# Patient Record
Sex: MALE | Race: WHITE | NOT HISPANIC OR LATINO | Employment: OTHER | URBAN - METROPOLITAN AREA
[De-identification: names, ages, dates, MRNs, and addresses within clinical notes are randomized per-mention and may not be internally consistent; named-entity substitution may affect disease eponyms.]

---

## 2021-07-29 ENCOUNTER — PREP FOR SURGERY (OUTPATIENT)
Dept: OTHER | Facility: HOSPITAL | Age: 66
End: 2021-07-29

## 2021-07-29 ENCOUNTER — OFFICE VISIT (OUTPATIENT)
Dept: NEUROSURGERY | Facility: CLINIC | Age: 66
End: 2021-07-29

## 2021-07-29 VITALS
OXYGEN SATURATION: 99 % | SYSTOLIC BLOOD PRESSURE: 132 MMHG | TEMPERATURE: 96.3 F | RESPIRATION RATE: 20 BRPM | WEIGHT: 247.6 LBS | DIASTOLIC BLOOD PRESSURE: 86 MMHG | BODY MASS INDEX: 36.67 KG/M2 | HEART RATE: 86 BPM | HEIGHT: 69 IN

## 2021-07-29 DIAGNOSIS — M48.062 SPINAL STENOSIS, LUMBAR REGION, WITH NEUROGENIC CLAUDICATION: Primary | ICD-10-CM

## 2021-07-29 DIAGNOSIS — M53.86 SCIATICA ASSOCIATED WITH DISORDER OF LUMBAR SPINE: Primary | ICD-10-CM

## 2021-07-29 PROBLEM — M43.16 SPONDYLOLISTHESIS AT L4-L5 LEVEL: Status: ACTIVE | Noted: 2021-07-29

## 2021-07-29 PROCEDURE — 99204 OFFICE O/P NEW MOD 45 MIN: CPT | Performed by: NEUROLOGICAL SURGERY

## 2021-07-29 RX ORDER — OXYCODONE HCL 10 MG/1
10 TABLET, FILM COATED, EXTENDED RELEASE ORAL ONCE
Status: CANCELLED | OUTPATIENT
Start: 2021-07-29 | End: 2021-07-29

## 2021-07-29 RX ORDER — METFORMIN HYDROCHLORIDE 500 MG/1
500 TABLET, EXTENDED RELEASE ORAL 2 TIMES DAILY
COMMUNITY
Start: 2021-06-04

## 2021-07-29 RX ORDER — LEVOTHYROXINE SODIUM 100 MCG
100 TABLET ORAL DAILY
COMMUNITY
Start: 2021-06-02

## 2021-07-29 RX ORDER — ACETAMINOPHEN 500 MG
1000 TABLET ORAL ONCE
Status: CANCELLED | OUTPATIENT
Start: 2021-07-29 | End: 2021-07-29

## 2021-07-29 RX ORDER — FAMOTIDINE 20 MG/1
20 TABLET, FILM COATED ORAL
Status: CANCELLED | OUTPATIENT
Start: 2021-07-29

## 2021-07-29 RX ORDER — ROSUVASTATIN CALCIUM 10 MG/1
10 TABLET, COATED ORAL DAILY
COMMUNITY
Start: 2021-07-08

## 2021-07-29 RX ORDER — GLIPIZIDE 5 MG/1
5 TABLET ORAL
COMMUNITY
Start: 2021-06-15

## 2021-07-29 RX ORDER — SODIUM CHLORIDE, SODIUM LACTATE, POTASSIUM CHLORIDE, CALCIUM CHLORIDE 600; 310; 30; 20 MG/100ML; MG/100ML; MG/100ML; MG/100ML
9 INJECTION, SOLUTION INTRAVENOUS CONTINUOUS
Status: CANCELLED | OUTPATIENT
Start: 2021-07-29

## 2021-07-29 RX ORDER — IBUPROFEN 800 MG/1
800 TABLET ORAL ONCE
Status: CANCELLED | OUTPATIENT
Start: 2021-07-29 | End: 2021-07-29

## 2021-07-29 RX ORDER — CHLORHEXIDINE GLUCONATE 4 G/100ML
SOLUTION TOPICAL
Qty: 120 ML | Refills: 0 | Status: ON HOLD | OUTPATIENT
Start: 2021-07-29 | End: 2021-10-13

## 2021-07-29 RX ORDER — LISINOPRIL 2.5 MG/1
TABLET ORAL
COMMUNITY
Start: 2021-06-03

## 2021-07-29 RX ORDER — ASPIRIN 81 MG/1
81 TABLET, CHEWABLE ORAL DAILY
COMMUNITY

## 2021-07-29 RX ORDER — CEFAZOLIN SODIUM 2 G/100ML
2 INJECTION, SOLUTION INTRAVENOUS ONCE
Status: CANCELLED | OUTPATIENT
Start: 2021-07-29 | End: 2021-07-29

## 2021-07-29 RX ORDER — PREGABALIN 75 MG/1
75 CAPSULE ORAL ONCE
Status: CANCELLED | OUTPATIENT
Start: 2021-07-29 | End: 2021-07-29

## 2021-07-29 NOTE — PROGRESS NOTES
NAME: EMMETT WAHL   DOS: 2021  : 1955  PCP: Josiah Smith MD    Chief Complaint:    Chief Complaint   Patient presents with   • Back Pain       History of Present Illness:  65 y.o. 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 he presents with a history of some chronic axial back pain that is gotten worse since 2019 he has symptoms classic of neurogenic claudication with radiation down the buttocks sacrum and legs bilaterally it tends more to the left than the right he denies any weakness flagrantly he has no bowel bladder incontinence issues and is here for evaluation he is on Glucotrol and Metformin he has a history of diabetes    PMHX  Allergies:  Allergies   Allergen Reactions   • Adhesive Tape Rash     Medications    Current Outpatient Medications:   •  aspirin 81 MG chewable tablet, Chew 81 mg Daily., Disp: , Rfl:   •  glipizide (GLUCOTROL) 5 MG tablet, , Disp: , Rfl:   •  lisinopril (PRINIVIL,ZESTRIL) 2.5 MG tablet, , Disp: , Rfl:   •  metFORMIN ER (GLUCOPHAGE-XR) 500 MG 24 hr tablet, , Disp: , Rfl:   •  rosuvastatin (CRESTOR) 10 MG tablet, , Disp: , Rfl:   •  Synthroid 100 MCG tablet, , Disp: , Rfl:   Past Medical History:  Past Medical History:   Diagnosis Date   • Arthritis    • Diabetes mellitus (CMS/HCC)    • Hypertension    • Low back pain      Past Surgical History:  Past Surgical History:   Procedure Laterality Date   • ANKLE SURGERY Left     Left ankle fusion, Dr. Etienne Rubio   • REPLACEMENT TOTAL KNEE Right     Dr. Isiah Rubio, 2x      Social Hx:  Social History     Tobacco Use   • Smoking status: Never Smoker   • Smokeless tobacco: Never Used   Substance Use Topics   • Alcohol use: Never   • Drug use: Never     Family Hx:  Family History   Problem Relation Age of Onset   • Arthritis Father    • Heart disease Father    • Cancer Sister      Review of Systems:        Review of Systems   Constitutional: Negative for activity change,  appetite change, chills, diaphoresis, fatigue, fever and unexpected weight change.   HENT: Negative for congestion, dental problem, drooling, ear discharge, ear pain, facial swelling, hearing loss, mouth sores, nosebleeds, postnasal drip, rhinorrhea, sinus pressure, sinus pain, sneezing, sore throat, tinnitus, trouble swallowing and voice change.    Eyes: Negative for photophobia, pain, discharge, redness, itching and visual disturbance.   Respiratory: Negative for apnea, cough, choking, chest tightness, shortness of breath, wheezing and stridor.    Cardiovascular: Negative for chest pain, palpitations and leg swelling.   Gastrointestinal: Negative for abdominal distention, abdominal pain, anal bleeding, blood in stool, constipation, diarrhea, nausea, rectal pain and vomiting.   Endocrine: Negative for cold intolerance, heat intolerance, polydipsia, polyphagia and polyuria.   Genitourinary: Negative for decreased urine volume, difficulty urinating, dysuria, enuresis, flank pain, frequency, genital sores, hematuria and urgency.   Musculoskeletal: Positive for back pain and joint swelling. Negative for arthralgias, gait problem, myalgias, neck pain and neck stiffness.   Skin: Negative for color change, pallor, rash and wound.   Allergic/Immunologic: Negative for environmental allergies, food allergies and immunocompromised state.   Neurological: Positive for dizziness and numbness. Negative for tremors, seizures, syncope, facial asymmetry, speech difficulty, weakness, light-headedness and headaches.   Hematological: Negative for adenopathy. Does not bruise/bleed easily.   Psychiatric/Behavioral: Negative for agitation, behavioral problems, confusion, decreased concentration, dysphoric mood, hallucinations, self-injury, sleep disturbance and suicidal ideas. The patient is not nervous/anxious and is not hyperactive.       I have reviewed this note template and all pertinent parts of the review of systems social, family  history, surgical history and medication list      Physical Examination:  Vitals:    07/29/21 1111   Resp: 20   SpO2: 99%      General Appearance:   Well developed, well nourished, well groomed, alert, and cooperative.  Neurological examination:  Neurologic Exam  Vital signs were reviewed and documented in the chart  Patient appeared in good neurologic function with normal comprehension fluent speech  Mood and affect are normal  Sense of smell deferred  Moderate obesity noted  Pupils symmetric equally reactive funduscopic exam not visualized   Visual fields intact to confrontation  Extraocular movements intact  Face motor function is symmetric  Facial sensations normal  Hearing intact to finger rub hearing intact to finger rub  Tongue is midline  Palate symmetric  Swallowing normal  Shoulder shrug normal    Muscle bulk and tone normal  5 out of 5 strength no motor drift  Gait normal intact  Negative Romberg  No clonus long tract signs or myelopathy    Reflexes symmetric trace-decreased vibratory sensation bilaterally  2+ edema noted and extremities skin appears normal    Straight leg raise sign absent  No signs of intrinsic hip dysfunction  Back is without any lesions or abnormality  Feet are warm and well perfused        Review of Imaging/DATA:  I reviewed his MRI personally interpreted the findings he has an MRI report as well that was reviewed is got an L4-5 spondylolisthesis with severe central spinal stenosis L5-S1 is moderate intraforaminal and is intraforaminal disease at 3 4 is without considered to be moderate      Diagnoses/Plan:    Mr. Fonseca is a 65 y.o. male   There is a 65-year-old gentleman with a relatively classic presentation of spondylolisthesis L4-5 neurogenic claudication.  He has comorbidities including obesity, diabetes but is otherwise a well adjusted and stable operative candidate.  I talked him about the role of physical therapy injections etc. I would not recommend them given the degree of  spinal stenosis I suspect he will do very well with a lumbar interbody fusion I talked him about laminectomy versus fusion the major surgical nature of these procedures the risk of recovery infection bleeding risk bladder retention need for revision surgeries etc.  I suspect laminectomy would not be the best course of action given his intraforaminal 4 5 disease, I suspect he will do very well with an L4-5 lumbar interbody fusion    Answered all his questions I like him to see his primary care doctor for a quick preop visit he has had a recent stress test per his reports that was unremarkable.  I also talked him about the concepts of training up for surgery given his diabetic history and being prepared .    He is a very nice gentleman make arrangements for L4-L5 lumbar interbody fusion.

## 2021-07-29 NOTE — PATIENT INSTRUCTIONS
At today's visit, we discussed patient's symptoms, EMG, and MRI. We discussed patient's medical history. Discussed the surgical option for nerve decompression and posterior lumbar fusion, or lumbar laminectomy, including procedure, risks, complications and recovery expectations. Discussed aquatic therapy, bicycling, and exercising therapy. Follow up with primary care physician to discuss surgery.

## 2021-07-30 PROBLEM — M53.86 SCIATICA ASSOCIATED WITH DISORDER OF LUMBAR SPINE: Status: ACTIVE | Noted: 2021-07-30

## 2021-09-13 ENCOUNTER — APPOINTMENT (OUTPATIENT)
Dept: PREADMISSION TESTING | Facility: HOSPITAL | Age: 66
End: 2021-09-13

## 2021-09-30 ENCOUNTER — TELEPHONE (OUTPATIENT)
Dept: NEUROSURGERY | Facility: CLINIC | Age: 66
End: 2021-09-30

## 2021-09-30 NOTE — TELEPHONE ENCOUNTER
Provider:  Luis  Caller: patient  Time of call: 10:15    Phone #:  [phone]  Surgery:  Lumbar laminectomy posterior lumbar interbody fusion  Surgery Date:  10-13-21  Last visit: 7-29-21    Next visit: BALDEV MIRZA:         Reason for call: Patient called and wanted to know if it will be ok for him to have his surgery after having his booster vaccine? Please Advise. Thank you.

## 2021-09-30 NOTE — TELEPHONE ENCOUNTER
I spoke with the patient and told him to get the vaccine per Josiah. He was thankful for the call back.

## 2021-10-11 ENCOUNTER — PRE-ADMISSION TESTING (OUTPATIENT)
Dept: PREADMISSION TESTING | Facility: HOSPITAL | Age: 66
End: 2021-10-11

## 2021-10-11 VITALS — HEIGHT: 69 IN | WEIGHT: 253.31 LBS | BODY MASS INDEX: 37.52 KG/M2

## 2021-10-11 DIAGNOSIS — M53.86 SCIATICA ASSOCIATED WITH DISORDER OF LUMBAR SPINE: ICD-10-CM

## 2021-10-11 LAB
ANION GAP SERPL CALCULATED.3IONS-SCNC: 15 MMOL/L (ref 5–15)
BUN SERPL-MCNC: 19 MG/DL (ref 8–23)
BUN/CREAT SERPL: 20.2 (ref 7–25)
CALCIUM SPEC-SCNC: 9.2 MG/DL (ref 8.6–10.5)
CHLORIDE SERPL-SCNC: 103 MMOL/L (ref 98–107)
CO2 SERPL-SCNC: 24 MMOL/L (ref 22–29)
CREAT SERPL-MCNC: 0.94 MG/DL (ref 0.76–1.27)
DEPRECATED RDW RBC AUTO: 45 FL (ref 37–54)
ERYTHROCYTE [DISTWIDTH] IN BLOOD BY AUTOMATED COUNT: 13.3 % (ref 12.3–15.4)
GFR SERPL CREATININE-BSD FRML MDRD: 80 ML/MIN/1.73
GLUCOSE SERPL-MCNC: 158 MG/DL (ref 65–99)
HBA1C MFR BLD: 7.4 % (ref 4.8–5.6)
HCT VFR BLD AUTO: 41.1 % (ref 37.5–51)
HGB BLD-MCNC: 14.6 G/DL (ref 13–17.7)
MCH RBC QN AUTO: 33 PG (ref 26.6–33)
MCHC RBC AUTO-ENTMCNC: 35.5 G/DL (ref 31.5–35.7)
MCV RBC AUTO: 92.8 FL (ref 79–97)
MRSA DNA SPEC QL NAA+PROBE: NEGATIVE
PLATELET # BLD AUTO: 180 10*3/MM3 (ref 140–450)
PMV BLD AUTO: 10.7 FL (ref 6–12)
POTASSIUM SERPL-SCNC: 4 MMOL/L (ref 3.5–5.2)
RBC # BLD AUTO: 4.43 10*6/MM3 (ref 4.14–5.8)
SARS-COV-2 RNA PNL SPEC NAA+PROBE: NOT DETECTED
SODIUM SERPL-SCNC: 142 MMOL/L (ref 136–145)
WBC # BLD AUTO: 6.83 10*3/MM3 (ref 3.4–10.8)

## 2021-10-11 PROCEDURE — U0005 INFEC AGEN DETEC AMPLI PROBE: HCPCS

## 2021-10-11 PROCEDURE — 36415 COLL VENOUS BLD VENIPUNCTURE: CPT

## 2021-10-11 PROCEDURE — U0004 COV-19 TEST NON-CDC HGH THRU: HCPCS

## 2021-10-11 PROCEDURE — C9803 HOPD COVID-19 SPEC COLLECT: HCPCS

## 2021-10-11 PROCEDURE — 80048 BASIC METABOLIC PNL TOTAL CA: CPT

## 2021-10-11 PROCEDURE — 87641 MR-STAPH DNA AMP PROBE: CPT

## 2021-10-11 PROCEDURE — 83036 HEMOGLOBIN GLYCOSYLATED A1C: CPT

## 2021-10-11 PROCEDURE — 85027 COMPLETE CBC AUTOMATED: CPT

## 2021-10-11 RX ORDER — OMEPRAZOLE 40 MG/1
40 CAPSULE, DELAYED RELEASE ORAL DAILY
COMMUNITY

## 2021-10-11 NOTE — PAT
Patient instructed to drink 20 ounces (or until full) of Gatorade and it needs to be completed 1 hour (for Main OR patients) or 2 hours (scheduled  section patients) before given arrival time for procedure (NO RED Gatorade)    Patient verbalized understanding.    Per Anesthesia Request, patient instructed not to take their ACE/ARB medications on the AM of surgery. - PT GOING TO VERIFY IF TAKE LISINOPRIL.     Prescription for Bactroban and Chlorhexidine shower called into patient's pharmacy or [facility] pharmacy by patient's surgeon.  Reinforced with patient to  the prescription from applicable pharmacy if they haven't already.  Verbal and written instructions given regarding proper use of the Bactroban and Chlorhexidine to patient and/or famlily during PAT visit. Patient/family also instructed to complete checklist and return it to Pre-op on the day of surgery.  Patient and/or family verbalized understanding.    COVID IN PAT.     EKG ON CHART 21.     Patient did not review general PAT education video as instructed in their preoperative information received from their surgeon.  One-on-one Pre Admission Testing general education provided during PAT visit.  Copies of PAT general education handouts (Incentive Spirometry, Meds to Beds Program, Patient Belongings, Pre-op skin preparation instructions, Blood Glucose testing, Visitor policy, Surgery FAQ, Code H) distributed to patient. Encouraged patient/family to read PAT general education handouts thoroughly and notify PAT staff with any questions or concerns. Patient instructed to bring PAT pass and completed skin prep sheet (if applicable) on the day of procedure. Patient verbalized understanding of all information and priority content.

## 2021-10-12 ENCOUNTER — ANESTHESIA EVENT (OUTPATIENT)
Dept: PERIOP | Facility: HOSPITAL | Age: 66
End: 2021-10-12

## 2021-10-13 ENCOUNTER — APPOINTMENT (OUTPATIENT)
Dept: GENERAL RADIOLOGY | Facility: HOSPITAL | Age: 66
End: 2021-10-13

## 2021-10-13 ENCOUNTER — ANESTHESIA (OUTPATIENT)
Dept: PERIOP | Facility: HOSPITAL | Age: 66
End: 2021-10-13

## 2021-10-13 ENCOUNTER — HOSPITAL ENCOUNTER (OUTPATIENT)
Facility: HOSPITAL | Age: 66
Discharge: HOME | End: 2021-10-14
Attending: NEUROLOGICAL SURGERY | Admitting: NEUROLOGICAL SURGERY

## 2021-10-13 DIAGNOSIS — M43.16 SPONDYLOLISTHESIS AT L4-L5 LEVEL: Primary | ICD-10-CM

## 2021-10-13 DIAGNOSIS — M53.86 SCIATICA ASSOCIATED WITH DISORDER OF LUMBAR SPINE: ICD-10-CM

## 2021-10-13 LAB
GLUCOSE BLDC GLUCOMTR-MCNC: 187 MG/DL (ref 70–130)
GLUCOSE BLDC GLUCOMTR-MCNC: 221 MG/DL (ref 70–130)
GLUCOSE BLDC GLUCOMTR-MCNC: 230 MG/DL (ref 70–130)
GLUCOSE BLDC GLUCOMTR-MCNC: 243 MG/DL (ref 70–130)

## 2021-10-13 PROCEDURE — 25010000002 ONDANSETRON PER 1 MG: Performed by: PHYSICIAN ASSISTANT

## 2021-10-13 PROCEDURE — 22840 INSERT SPINE FIXATION DEVICE: CPT | Performed by: PHYSICIAN ASSISTANT

## 2021-10-13 PROCEDURE — 63710000001 FAMOTIDINE 20 MG TABLET: Performed by: NEUROLOGICAL SURGERY

## 2021-10-13 PROCEDURE — 63710000001 PREGABALIN 75 MG CAPSULE: Performed by: NEUROLOGICAL SURGERY

## 2021-10-13 PROCEDURE — A9270 NON-COVERED ITEM OR SERVICE: HCPCS | Performed by: NEUROLOGICAL SURGERY

## 2021-10-13 PROCEDURE — 25010000002 HYDRALAZINE PER 20 MG: Performed by: NURSE ANESTHETIST, CERTIFIED REGISTERED

## 2021-10-13 PROCEDURE — 25010000003 CEFAZOLIN IN DEXTROSE 2-4 GM/100ML-% SOLUTION: Performed by: NEUROLOGICAL SURGERY

## 2021-10-13 PROCEDURE — C1889 IMPLANT/INSERT DEVICE, NOC: HCPCS | Performed by: NEUROLOGICAL SURGERY

## 2021-10-13 PROCEDURE — 63710000001 IBUPROFEN 800 MG TABLET: Performed by: NEUROLOGICAL SURGERY

## 2021-10-13 PROCEDURE — 25010000002 CEFAZOLIN PER 500 MG: Performed by: NEUROLOGICAL SURGERY

## 2021-10-13 PROCEDURE — C1713 ANCHOR/SCREW BN/BN,TIS/BN: HCPCS | Performed by: NEUROLOGICAL SURGERY

## 2021-10-13 PROCEDURE — 63710000001 ACETAMINOPHEN 325 MG TABLET: Performed by: NEUROLOGICAL SURGERY

## 2021-10-13 PROCEDURE — 25010000002 HYDROMORPHONE PER 4 MG: Performed by: NURSE ANESTHETIST, CERTIFIED REGISTERED

## 2021-10-13 PROCEDURE — 25010000002 DEXAMETHASONE PER 1 MG: Performed by: NURSE ANESTHETIST, CERTIFIED REGISTERED

## 2021-10-13 PROCEDURE — 22840 INSERT SPINE FIXATION DEVICE: CPT | Performed by: NEUROLOGICAL SURGERY

## 2021-10-13 PROCEDURE — 63710000001 GLIPIZIDE 5 MG TABLET: Performed by: NEUROLOGICAL SURGERY

## 2021-10-13 PROCEDURE — 63710000001 MINERAL OIL OIL 10 ML VIAL: Performed by: NEUROLOGICAL SURGERY

## 2021-10-13 PROCEDURE — 63710000001 ACETAMINOPHEN 500 MG TABLET: Performed by: NEUROLOGICAL SURGERY

## 2021-10-13 PROCEDURE — 25010000002 BUPRENORPHINE PER 0.1 MG: Performed by: NEUROLOGICAL SURGERY

## 2021-10-13 PROCEDURE — 22853 INSJ BIOMECHANICAL DEVICE: CPT | Performed by: NEUROLOGICAL SURGERY

## 2021-10-13 PROCEDURE — 82962 GLUCOSE BLOOD TEST: CPT

## 2021-10-13 PROCEDURE — 25010000002 NEOSTIGMINE 10 MG/10ML SOLUTION: Performed by: NURSE ANESTHETIST, CERTIFIED REGISTERED

## 2021-10-13 PROCEDURE — 63710000001 LISINOPRIL 5 MG TABLET: Performed by: NEUROLOGICAL SURGERY

## 2021-10-13 PROCEDURE — 63710000001 POLYETHYLENE GLYCOL 17 G PACK: Performed by: NEUROLOGICAL SURGERY

## 2021-10-13 PROCEDURE — 25010000002 ONDANSETRON PER 1 MG: Performed by: NURSE ANESTHETIST, CERTIFIED REGISTERED

## 2021-10-13 PROCEDURE — 25010000002 FENTANYL CITRATE (PF) 50 MCG/ML SOLUTION: Performed by: NURSE ANESTHETIST, CERTIFIED REGISTERED

## 2021-10-13 PROCEDURE — 94799 UNLISTED PULMONARY SVC/PX: CPT

## 2021-10-13 PROCEDURE — 76000 FLUOROSCOPY <1 HR PHYS/QHP: CPT

## 2021-10-13 PROCEDURE — 61783 SCAN PROC SPINAL: CPT | Performed by: NEUROLOGICAL SURGERY

## 2021-10-13 PROCEDURE — 22853 INSJ BIOMECHANICAL DEVICE: CPT | Performed by: PHYSICIAN ASSISTANT

## 2021-10-13 PROCEDURE — 22630 ARTHRD PST TQ 1NTRSPC LUM: CPT | Performed by: NEUROLOGICAL SURGERY

## 2021-10-13 PROCEDURE — 25010000002 PROPOFOL 10 MG/ML EMULSION: Performed by: NURSE ANESTHETIST, CERTIFIED REGISTERED

## 2021-10-13 PROCEDURE — 25010000002 DEXAMETHASONE SODIUM PHOSPHATE 10 MG/ML SOLUTION 1 ML VIAL: Performed by: NEUROLOGICAL SURGERY

## 2021-10-13 PROCEDURE — 22630 ARTHRD PST TQ 1NTRSPC LUM: CPT | Performed by: PHYSICIAN ASSISTANT

## 2021-10-13 PROCEDURE — 63710000001 OXYCODONE 10 MG TABLET EXTENDED-RELEASE 12 HOUR: Performed by: NEUROLOGICAL SURGERY

## 2021-10-13 DEVICE — HEMOST ABS SURGIFOAM SZ100 8X12 10MM: Type: IMPLANTABLE DEVICE | Site: SPINE LUMBAR | Status: FUNCTIONAL

## 2021-10-13 DEVICE — CAGE 2661124 WAVE D 11MM X 24MM 6DEG
Type: IMPLANTABLE DEVICE | Site: SPINE LUMBAR | Status: FUNCTIONAL
Brand: WAVE D SPINAL SYSTEM

## 2021-10-13 DEVICE — ROD 1475501035 4.75 CCM NS CURV 35MM
Type: IMPLANTABLE DEVICE | Site: SPINE LUMBAR | Status: FUNCTIONAL
Brand: CD HORIZON® SPINAL SYSTEM

## 2021-10-13 DEVICE — FLOSEAL HEMOSTATIC MATRIX, 10ML
Type: IMPLANTABLE DEVICE | Site: SPINE LUMBAR | Status: FUNCTIONAL
Brand: FLOSEAL HEMOSTATIC MATRIX

## 2021-10-13 DEVICE — DBM T45010 10CC PASTE GRAFTON PLUS
Type: IMPLANTABLE DEVICE | Site: SPINE LUMBAR | Status: FUNCTIONAL
Brand: GRAFTON®AND GRAFTON PLUS®DEMINERALIZED BONE MATRIX (DBM)

## 2021-10-13 RX ORDER — SODIUM CHLORIDE, SODIUM LACTATE, POTASSIUM CHLORIDE, CALCIUM CHLORIDE 600; 310; 30; 20 MG/100ML; MG/100ML; MG/100ML; MG/100ML
9 INJECTION, SOLUTION INTRAVENOUS CONTINUOUS
Status: DISCONTINUED | OUTPATIENT
Start: 2021-10-13 | End: 2021-10-14

## 2021-10-13 RX ORDER — HYDRALAZINE HYDROCHLORIDE 20 MG/ML
INJECTION INTRAMUSCULAR; INTRAVENOUS
Status: DISPENSED
Start: 2021-10-13 | End: 2021-10-14

## 2021-10-13 RX ORDER — LEVOTHYROXINE SODIUM 0.1 MG/1
100 TABLET ORAL DAILY
Status: DISCONTINUED | OUTPATIENT
Start: 2021-10-14 | End: 2021-10-14 | Stop reason: HOSPADM

## 2021-10-13 RX ORDER — IPRATROPIUM BROMIDE AND ALBUTEROL SULFATE 2.5; .5 MG/3ML; MG/3ML
3 SOLUTION RESPIRATORY (INHALATION) ONCE AS NEEDED
Status: DISCONTINUED | OUTPATIENT
Start: 2021-10-13 | End: 2021-10-13 | Stop reason: HOSPADM

## 2021-10-13 RX ORDER — SODIUM CHLORIDE, SODIUM LACTATE, POTASSIUM CHLORIDE, CALCIUM CHLORIDE 600; 310; 30; 20 MG/100ML; MG/100ML; MG/100ML; MG/100ML
100 INJECTION, SOLUTION INTRAVENOUS CONTINUOUS
Status: DISCONTINUED | OUTPATIENT
Start: 2021-10-13 | End: 2021-10-14 | Stop reason: HOSPADM

## 2021-10-13 RX ORDER — HYDRALAZINE HYDROCHLORIDE 20 MG/ML
5 INJECTION INTRAMUSCULAR; INTRAVENOUS
Status: DISCONTINUED | OUTPATIENT
Start: 2021-10-13 | End: 2021-10-13 | Stop reason: HOSPADM

## 2021-10-13 RX ORDER — LIDOCAINE HYDROCHLORIDE AND EPINEPHRINE 5; 5 MG/ML; UG/ML
INJECTION, SOLUTION INFILTRATION; PERINEURAL AS NEEDED
Status: DISCONTINUED | OUTPATIENT
Start: 2021-10-13 | End: 2021-10-13 | Stop reason: HOSPADM

## 2021-10-13 RX ORDER — FAMOTIDINE 20 MG/1
20 TABLET, FILM COATED ORAL
Status: COMPLETED | OUTPATIENT
Start: 2021-10-13 | End: 2021-10-13

## 2021-10-13 RX ORDER — DROPERIDOL 2.5 MG/ML
0.62 INJECTION, SOLUTION INTRAMUSCULAR; INTRAVENOUS ONCE AS NEEDED
Status: DISCONTINUED | OUTPATIENT
Start: 2021-10-13 | End: 2021-10-13 | Stop reason: HOSPADM

## 2021-10-13 RX ORDER — FENTANYL CITRATE 50 UG/ML
INJECTION, SOLUTION INTRAMUSCULAR; INTRAVENOUS AS NEEDED
Status: DISCONTINUED | OUTPATIENT
Start: 2021-10-13 | End: 2021-10-13 | Stop reason: SURG

## 2021-10-13 RX ORDER — ONDANSETRON 2 MG/ML
4 INJECTION INTRAMUSCULAR; INTRAVENOUS ONCE AS NEEDED
Status: DISCONTINUED | OUTPATIENT
Start: 2021-10-13 | End: 2021-10-13 | Stop reason: HOSPADM

## 2021-10-13 RX ORDER — ACETAMINOPHEN 500 MG
1000 TABLET ORAL ONCE
Status: COMPLETED | OUTPATIENT
Start: 2021-10-13 | End: 2021-10-13

## 2021-10-13 RX ORDER — ONDANSETRON 2 MG/ML
4 INJECTION INTRAMUSCULAR; INTRAVENOUS EVERY 6 HOURS PRN
Status: DISCONTINUED | OUTPATIENT
Start: 2021-10-13 | End: 2021-10-14 | Stop reason: HOSPADM

## 2021-10-13 RX ORDER — GLIPIZIDE 5 MG/1
5 TABLET ORAL
Status: DISCONTINUED | OUTPATIENT
Start: 2021-10-13 | End: 2021-10-14 | Stop reason: HOSPADM

## 2021-10-13 RX ORDER — MINERAL OIL
OIL (ML) MISCELLANEOUS AS NEEDED
Status: DISCONTINUED | OUTPATIENT
Start: 2021-10-13 | End: 2021-10-13 | Stop reason: HOSPADM

## 2021-10-13 RX ORDER — SODIUM CHLORIDE, SODIUM LACTATE, POTASSIUM CHLORIDE, CALCIUM CHLORIDE 600; 310; 30; 20 MG/100ML; MG/100ML; MG/100ML; MG/100ML
9 INJECTION, SOLUTION INTRAVENOUS CONTINUOUS
Status: DISCONTINUED | OUTPATIENT
Start: 2021-10-13 | End: 2021-10-14 | Stop reason: HOSPADM

## 2021-10-13 RX ORDER — TEMAZEPAM 15 MG/1
15 CAPSULE ORAL NIGHTLY PRN
Status: DISCONTINUED | OUTPATIENT
Start: 2021-10-13 | End: 2021-10-14 | Stop reason: HOSPADM

## 2021-10-13 RX ORDER — PROMETHAZINE HYDROCHLORIDE 25 MG/1
25 SUPPOSITORY RECTAL ONCE AS NEEDED
Status: DISCONTINUED | OUTPATIENT
Start: 2021-10-13 | End: 2021-10-13 | Stop reason: HOSPADM

## 2021-10-13 RX ORDER — PANTOPRAZOLE SODIUM 40 MG/1
40 TABLET, DELAYED RELEASE ORAL EVERY MORNING
Status: DISCONTINUED | OUTPATIENT
Start: 2021-10-14 | End: 2021-10-14 | Stop reason: HOSPADM

## 2021-10-13 RX ORDER — SODIUM CHLORIDE 0.9 % (FLUSH) 0.9 %
10 SYRINGE (ML) INJECTION AS NEEDED
Status: DISCONTINUED | OUTPATIENT
Start: 2021-10-13 | End: 2021-10-14 | Stop reason: HOSPADM

## 2021-10-13 RX ORDER — FENTANYL CITRATE 50 UG/ML
50 INJECTION, SOLUTION INTRAMUSCULAR; INTRAVENOUS
Status: DISCONTINUED | OUTPATIENT
Start: 2021-10-13 | End: 2021-10-13 | Stop reason: HOSPADM

## 2021-10-13 RX ORDER — MAGNESIUM HYDROXIDE 1200 MG/15ML
LIQUID ORAL AS NEEDED
Status: DISCONTINUED | OUTPATIENT
Start: 2021-10-13 | End: 2021-10-13 | Stop reason: HOSPADM

## 2021-10-13 RX ORDER — SODIUM CHLORIDE 0.9 % (FLUSH) 0.9 %
3 SYRINGE (ML) INJECTION EVERY 12 HOURS SCHEDULED
Status: DISCONTINUED | OUTPATIENT
Start: 2021-10-13 | End: 2021-10-14 | Stop reason: HOSPADM

## 2021-10-13 RX ORDER — SODIUM CHLORIDE 0.9 % (FLUSH) 0.9 %
3-10 SYRINGE (ML) INJECTION AS NEEDED
Status: DISCONTINUED | OUTPATIENT
Start: 2021-10-13 | End: 2021-10-13 | Stop reason: HOSPADM

## 2021-10-13 RX ORDER — CEFAZOLIN SODIUM IN 0.9 % NACL 3 G/100 ML
3 INTRAVENOUS SOLUTION, PIGGYBACK (ML) INTRAVENOUS EVERY 8 HOURS
Status: COMPLETED | OUTPATIENT
Start: 2021-10-13 | End: 2021-10-14

## 2021-10-13 RX ORDER — NALOXONE HCL 0.4 MG/ML
0.4 VIAL (ML) INJECTION
Status: DISCONTINUED | OUTPATIENT
Start: 2021-10-13 | End: 2021-10-14 | Stop reason: HOSPADM

## 2021-10-13 RX ORDER — SODIUM CHLORIDE 0.9 % (FLUSH) 0.9 %
10 SYRINGE (ML) INJECTION AS NEEDED
Status: DISCONTINUED | OUTPATIENT
Start: 2021-10-13 | End: 2021-10-13 | Stop reason: HOSPADM

## 2021-10-13 RX ORDER — LIDOCAINE HYDROCHLORIDE 10 MG/ML
INJECTION, SOLUTION EPIDURAL; INFILTRATION; INTRACAUDAL; PERINEURAL AS NEEDED
Status: DISCONTINUED | OUTPATIENT
Start: 2021-10-13 | End: 2021-10-13 | Stop reason: SURG

## 2021-10-13 RX ORDER — HYDROMORPHONE HYDROCHLORIDE 1 MG/ML
0.5 INJECTION, SOLUTION INTRAMUSCULAR; INTRAVENOUS; SUBCUTANEOUS
Status: DISCONTINUED | OUTPATIENT
Start: 2021-10-13 | End: 2021-10-13 | Stop reason: HOSPADM

## 2021-10-13 RX ORDER — METHOCARBAMOL 500 MG/1
1000 TABLET, FILM COATED ORAL 4 TIMES DAILY PRN
Status: DISCONTINUED | OUTPATIENT
Start: 2021-10-13 | End: 2021-10-14 | Stop reason: HOSPADM

## 2021-10-13 RX ORDER — ROCURONIUM BROMIDE 10 MG/ML
INJECTION, SOLUTION INTRAVENOUS AS NEEDED
Status: DISCONTINUED | OUTPATIENT
Start: 2021-10-13 | End: 2021-10-13 | Stop reason: SURG

## 2021-10-13 RX ORDER — SODIUM CHLORIDE 0.9 % (FLUSH) 0.9 %
10 SYRINGE (ML) INJECTION EVERY 12 HOURS SCHEDULED
Status: DISCONTINUED | OUTPATIENT
Start: 2021-10-13 | End: 2021-10-13 | Stop reason: HOSPADM

## 2021-10-13 RX ORDER — HYDROMORPHONE HYDROCHLORIDE 1 MG/ML
0.5 INJECTION, SOLUTION INTRAMUSCULAR; INTRAVENOUS; SUBCUTANEOUS
Status: DISCONTINUED | OUTPATIENT
Start: 2021-10-13 | End: 2021-10-14 | Stop reason: HOSPADM

## 2021-10-13 RX ORDER — EPHEDRINE SULFATE 50 MG/ML
INJECTION, SOLUTION INTRAVENOUS AS NEEDED
Status: DISCONTINUED | OUTPATIENT
Start: 2021-10-13 | End: 2021-10-13 | Stop reason: SURG

## 2021-10-13 RX ORDER — NEOSTIGMINE METHYLSULFATE 1 MG/ML
INJECTION, SOLUTION INTRAVENOUS AS NEEDED
Status: DISCONTINUED | OUTPATIENT
Start: 2021-10-13 | End: 2021-10-13 | Stop reason: SURG

## 2021-10-13 RX ORDER — SODIUM CHLORIDE 0.9 % (FLUSH) 0.9 %
3 SYRINGE (ML) INJECTION EVERY 12 HOURS SCHEDULED
Status: DISCONTINUED | OUTPATIENT
Start: 2021-10-13 | End: 2021-10-13 | Stop reason: HOSPADM

## 2021-10-13 RX ORDER — PREGABALIN 75 MG/1
75 CAPSULE ORAL ONCE
Status: COMPLETED | OUTPATIENT
Start: 2021-10-13 | End: 2021-10-13

## 2021-10-13 RX ORDER — ESMOLOL HYDROCHLORIDE 10 MG/ML
INJECTION INTRAVENOUS AS NEEDED
Status: DISCONTINUED | OUTPATIENT
Start: 2021-10-13 | End: 2021-10-13 | Stop reason: SURG

## 2021-10-13 RX ORDER — ONDANSETRON 2 MG/ML
INJECTION INTRAMUSCULAR; INTRAVENOUS AS NEEDED
Status: DISCONTINUED | OUTPATIENT
Start: 2021-10-13 | End: 2021-10-13 | Stop reason: SURG

## 2021-10-13 RX ORDER — PROPOFOL 10 MG/ML
VIAL (ML) INTRAVENOUS AS NEEDED
Status: DISCONTINUED | OUTPATIENT
Start: 2021-10-13 | End: 2021-10-13 | Stop reason: SURG

## 2021-10-13 RX ORDER — DEXAMETHASONE SODIUM PHOSPHATE 10 MG/ML
INJECTION INTRAMUSCULAR; INTRAVENOUS AS NEEDED
Status: DISCONTINUED | OUTPATIENT
Start: 2021-10-13 | End: 2021-10-13 | Stop reason: SURG

## 2021-10-13 RX ORDER — CEFAZOLIN SODIUM 2 G/100ML
2 INJECTION, SOLUTION INTRAVENOUS ONCE
Status: COMPLETED | OUTPATIENT
Start: 2021-10-13 | End: 2021-10-13

## 2021-10-13 RX ORDER — HYDROCODONE BITARTRATE AND ACETAMINOPHEN 7.5; 325 MG/1; MG/1
2 TABLET ORAL EVERY 4 HOURS PRN
Qty: 60 TABLET | Refills: 0 | Status: SHIPPED | OUTPATIENT
Start: 2021-10-13 | End: 2021-11-29

## 2021-10-13 RX ORDER — PROMETHAZINE HYDROCHLORIDE 25 MG/1
25 TABLET ORAL ONCE AS NEEDED
Status: DISCONTINUED | OUTPATIENT
Start: 2021-10-13 | End: 2021-10-13 | Stop reason: HOSPADM

## 2021-10-13 RX ORDER — LISINOPRIL 5 MG/1
5 TABLET ORAL
Status: DISCONTINUED | OUTPATIENT
Start: 2021-10-13 | End: 2021-10-14 | Stop reason: HOSPADM

## 2021-10-13 RX ORDER — OXYCODONE HCL 10 MG/1
10 TABLET, FILM COATED, EXTENDED RELEASE ORAL ONCE
Status: COMPLETED | OUTPATIENT
Start: 2021-10-13 | End: 2021-10-13

## 2021-10-13 RX ORDER — IBUPROFEN 800 MG/1
800 TABLET ORAL ONCE
Status: COMPLETED | OUTPATIENT
Start: 2021-10-13 | End: 2021-10-13

## 2021-10-13 RX ORDER — HYDROCODONE BITARTRATE AND ACETAMINOPHEN 7.5; 325 MG/1; MG/1
2 TABLET ORAL EVERY 4 HOURS PRN
Status: DISCONTINUED | OUTPATIENT
Start: 2021-10-13 | End: 2021-10-14 | Stop reason: HOSPADM

## 2021-10-13 RX ORDER — LABETALOL HYDROCHLORIDE 5 MG/ML
5 INJECTION, SOLUTION INTRAVENOUS
Status: DISCONTINUED | OUTPATIENT
Start: 2021-10-13 | End: 2021-10-13 | Stop reason: HOSPADM

## 2021-10-13 RX ORDER — SODIUM CHLORIDE, SODIUM LACTATE, POTASSIUM CHLORIDE, CALCIUM CHLORIDE 600; 310; 30; 20 MG/100ML; MG/100ML; MG/100ML; MG/100ML
INJECTION, SOLUTION INTRAVENOUS CONTINUOUS PRN
Status: DISCONTINUED | OUTPATIENT
Start: 2021-10-13 | End: 2021-10-13 | Stop reason: SURG

## 2021-10-13 RX ORDER — MIDAZOLAM HYDROCHLORIDE 1 MG/ML
0.5 INJECTION INTRAMUSCULAR; INTRAVENOUS
Status: DISCONTINUED | OUTPATIENT
Start: 2021-10-13 | End: 2021-10-13 | Stop reason: HOSPADM

## 2021-10-13 RX ORDER — HYDROCODONE BITARTRATE AND ACETAMINOPHEN 5; 325 MG/1; MG/1
1 TABLET ORAL ONCE AS NEEDED
Status: DISCONTINUED | OUTPATIENT
Start: 2021-10-13 | End: 2021-10-13 | Stop reason: HOSPADM

## 2021-10-13 RX ORDER — DROPERIDOL 2.5 MG/ML
0.62 INJECTION, SOLUTION INTRAMUSCULAR; INTRAVENOUS AS NEEDED
Status: DISCONTINUED | OUTPATIENT
Start: 2021-10-13 | End: 2021-10-13 | Stop reason: HOSPADM

## 2021-10-13 RX ORDER — POLYETHYLENE GLYCOL 3350 17 G/17G
17 POWDER, FOR SOLUTION ORAL DAILY
Status: DISCONTINUED | OUTPATIENT
Start: 2021-10-13 | End: 2021-10-14 | Stop reason: HOSPADM

## 2021-10-13 RX ORDER — LIDOCAINE HYDROCHLORIDE 10 MG/ML
0.5 INJECTION, SOLUTION EPIDURAL; INFILTRATION; INTRACAUDAL; PERINEURAL ONCE AS NEEDED
Status: COMPLETED | OUTPATIENT
Start: 2021-10-13 | End: 2021-10-13

## 2021-10-13 RX ORDER — GLYCOPYRROLATE 0.2 MG/ML
INJECTION INTRAMUSCULAR; INTRAVENOUS AS NEEDED
Status: DISCONTINUED | OUTPATIENT
Start: 2021-10-13 | End: 2021-10-13 | Stop reason: SURG

## 2021-10-13 RX ORDER — ACETAMINOPHEN 325 MG/1
650 TABLET ORAL EVERY 6 HOURS
Status: DISCONTINUED | OUTPATIENT
Start: 2021-10-13 | End: 2021-10-14 | Stop reason: HOSPADM

## 2021-10-13 RX ORDER — NALOXONE HCL 0.4 MG/ML
0.4 VIAL (ML) INJECTION AS NEEDED
Status: DISCONTINUED | OUTPATIENT
Start: 2021-10-13 | End: 2021-10-13 | Stop reason: HOSPADM

## 2021-10-13 RX ORDER — OXYCODONE HCL 10 MG/1
10 TABLET, FILM COATED, EXTENDED RELEASE ORAL EVERY 12 HOURS SCHEDULED
Status: DISCONTINUED | OUTPATIENT
Start: 2021-10-13 | End: 2021-10-14 | Stop reason: HOSPADM

## 2021-10-13 RX ORDER — MEPERIDINE HYDROCHLORIDE 25 MG/ML
12.5 INJECTION INTRAMUSCULAR; INTRAVENOUS; SUBCUTANEOUS
Status: DISCONTINUED | OUTPATIENT
Start: 2021-10-13 | End: 2021-10-13 | Stop reason: HOSPADM

## 2021-10-13 RX ADMIN — OXYCODONE HYDROCHLORIDE 10 MG: 10 TABLET, FILM COATED, EXTENDED RELEASE ORAL at 21:25

## 2021-10-13 RX ADMIN — PROPOFOL 20 MCG/KG/MIN: 10 INJECTION, EMULSION INTRAVENOUS at 11:58

## 2021-10-13 RX ADMIN — EPHEDRINE SULFATE 5 MG: 50 INJECTION INTRAVENOUS at 12:10

## 2021-10-13 RX ADMIN — IBUPROFEN 800 MG: 800 TABLET, FILM COATED ORAL at 10:50

## 2021-10-13 RX ADMIN — ONDANSETRON 4 MG: 2 INJECTION INTRAMUSCULAR; INTRAVENOUS at 18:07

## 2021-10-13 RX ADMIN — LISINOPRIL 5 MG: 5 TABLET ORAL at 17:22

## 2021-10-13 RX ADMIN — GLIPIZIDE 5 MG: 5 TABLET ORAL at 17:22

## 2021-10-13 RX ADMIN — FENTANYL CITRATE 50 MCG: 50 INJECTION, SOLUTION INTRAMUSCULAR; INTRAVENOUS at 11:32

## 2021-10-13 RX ADMIN — ESMOLOL HYDROCHLORIDE 50 MG: 10 INJECTION, SOLUTION INTRAVENOUS at 11:32

## 2021-10-13 RX ADMIN — HYDROMORPHONE HYDROCHLORIDE 0.5 MG: 1 INJECTION, SOLUTION INTRAMUSCULAR; INTRAVENOUS; SUBCUTANEOUS at 14:36

## 2021-10-13 RX ADMIN — EPHEDRINE SULFATE 10 MG: 50 INJECTION INTRAVENOUS at 12:15

## 2021-10-13 RX ADMIN — LIDOCAINE HYDROCHLORIDE 50 MG: 10 INJECTION, SOLUTION EPIDURAL; INFILTRATION; INTRACAUDAL; PERINEURAL at 11:32

## 2021-10-13 RX ADMIN — EPHEDRINE SULFATE 10 MG: 50 INJECTION INTRAVENOUS at 12:45

## 2021-10-13 RX ADMIN — FAMOTIDINE 20 MG: 20 TABLET ORAL at 10:50

## 2021-10-13 RX ADMIN — SODIUM CHLORIDE, POTASSIUM CHLORIDE, SODIUM LACTATE AND CALCIUM CHLORIDE 100 ML/HR: 600; 310; 30; 20 INJECTION, SOLUTION INTRAVENOUS at 16:43

## 2021-10-13 RX ADMIN — ONDANSETRON 4 MG: 2 INJECTION INTRAMUSCULAR; INTRAVENOUS at 13:30

## 2021-10-13 RX ADMIN — ACETAMINOPHEN 650 MG: 325 TABLET, FILM COATED ORAL at 17:22

## 2021-10-13 RX ADMIN — NEOSTIGMINE METHYLSULFATE 3 MG: 0.5 INJECTION INTRAVENOUS at 13:36

## 2021-10-13 RX ADMIN — EPHEDRINE SULFATE 5 MG: 50 INJECTION INTRAVENOUS at 11:50

## 2021-10-13 RX ADMIN — ROCURONIUM BROMIDE 50 MG: 10 INJECTION, SOLUTION INTRAVENOUS at 11:32

## 2021-10-13 RX ADMIN — PREGABALIN 75 MG: 75 CAPSULE ORAL at 10:50

## 2021-10-13 RX ADMIN — CEFAZOLIN SODIUM 2 G: 2 INJECTION, SOLUTION INTRAVENOUS at 11:39

## 2021-10-13 RX ADMIN — HYDRALAZINE HYDROCHLORIDE 5 MG: 20 INJECTION INTRAMUSCULAR; INTRAVENOUS at 15:06

## 2021-10-13 RX ADMIN — OXYCODONE HYDROCHLORIDE 10 MG: 10 TABLET, FILM COATED, EXTENDED RELEASE ORAL at 10:50

## 2021-10-13 RX ADMIN — FENTANYL CITRATE 50 MCG: 50 INJECTION, SOLUTION INTRAMUSCULAR; INTRAVENOUS at 11:45

## 2021-10-13 RX ADMIN — SODIUM CHLORIDE, POTASSIUM CHLORIDE, SODIUM LACTATE AND CALCIUM CHLORIDE 9 ML/HR: 600; 310; 30; 20 INJECTION, SOLUTION INTRAVENOUS at 10:50

## 2021-10-13 RX ADMIN — POLYETHYLENE GLYCOL 3350 17 G: 17 POWDER, FOR SOLUTION ORAL at 17:22

## 2021-10-13 RX ADMIN — SODIUM CHLORIDE, POTASSIUM CHLORIDE, SODIUM LACTATE AND CALCIUM CHLORIDE: 600; 310; 30; 20 INJECTION, SOLUTION INTRAVENOUS at 12:41

## 2021-10-13 RX ADMIN — SODIUM CHLORIDE, POTASSIUM CHLORIDE, SODIUM LACTATE AND CALCIUM CHLORIDE: 600; 310; 30; 20 INJECTION, SOLUTION INTRAVENOUS at 11:29

## 2021-10-13 RX ADMIN — HYDROMORPHONE HYDROCHLORIDE 0.5 MG: 1 INJECTION, SOLUTION INTRAMUSCULAR; INTRAVENOUS; SUBCUTANEOUS at 16:04

## 2021-10-13 RX ADMIN — ACETAMINOPHEN 1000 MG: 500 TABLET ORAL at 10:50

## 2021-10-13 RX ADMIN — LIDOCAINE HYDROCHLORIDE 0.5 ML: 10 INJECTION, SOLUTION EPIDURAL; INFILTRATION; INTRACAUDAL; PERINEURAL at 10:50

## 2021-10-13 RX ADMIN — DEXAMETHASONE SODIUM PHOSPHATE 4 MG: 10 INJECTION INTRAMUSCULAR; INTRAVENOUS at 11:52

## 2021-10-13 RX ADMIN — EPHEDRINE SULFATE 10 MG: 50 INJECTION INTRAVENOUS at 12:29

## 2021-10-13 RX ADMIN — GLYCOPYRROLATE 0.4 MG: 0.2 INJECTION INTRAMUSCULAR; INTRAVENOUS at 13:36

## 2021-10-13 RX ADMIN — EPHEDRINE SULFATE 10 MG: 50 INJECTION INTRAVENOUS at 12:58

## 2021-10-13 RX ADMIN — CEFAZOLIN 3 G: 10 INJECTION, POWDER, FOR SOLUTION INTRAVENOUS at 17:22

## 2021-10-13 RX ADMIN — PROPOFOL 150 MG: 10 INJECTION, EMULSION INTRAVENOUS at 11:32

## 2021-10-13 NOTE — H&P
Pre-Op H&P  Tay Fonseca  [id number]  1955    Chief complaint: Low back pain    HPI:    Patient is a 65 y.o.male who presents with a classic presentation of spondylolisthesis L4-L5 with neurogenic claudication.  He has comorbidities including obesity and diabetes, but is otherwise a well adjusted and stable operative candidate. Dr. Smith talked to him about the role of physical therapy, injections, etc. but does not recommend them given the degree of his spinal stenosis. Surgical intervention is recommended and he is agreeable. He is here today for a lumbar laminectomy, bilateral posterior lumbar interbody fusion L4-L5.    Review of Systems:  General ROS: negative for chills, fever or skin lesions;  No changes since last office visit.  Neg for recent sick exposure  Cardiovascular ROS: no chest pain or dyspnea on exertion; +HTN, +dyslipidemia  Respiratory ROS: no cough, shortness of breath, or wheezing  Endocrine ROS: +DM type II, +hypothyroidism    Allergies:   Allergies   Allergen Reactions   • Adhesive Tape Rash     BANDAID DURING PICC LINE DRESSING        Home Meds:    No current facility-administered medications on file prior to encounter.     Current Outpatient Medications on File Prior to Encounter   Medication Sig Dispense Refill   • aspirin 81 MG chewable tablet Chew 81 mg Daily. LD 3 DAYS AGO     • chlorhexidine (HIBICLENS) 4 % external liquid Shower each day with solution for 5 days beginning 5 days before surgery. 120 mL 0   • glipizide (GLUCOTROL) 5 MG tablet Take 5 mg by mouth 2 (Two) Times a Day Before Meals.     • lisinopril (PRINIVIL,ZESTRIL) 2.5 MG tablet      • metFORMIN ER (GLUCOPHAGE-XR) 500 MG 24 hr tablet Take 500 mg by mouth 2 (two) times a day.     • rosuvastatin (CRESTOR) 10 MG tablet Take 10 mg by mouth Daily.     • Synthroid 100 MCG tablet Take 100 mcg by mouth Daily.         PMH:   Past Medical History:   Diagnosis Date   • Arthritis    • COVID-19 vaccine series completed      HAD BOTH SHOTS AND HAD BOOSTER FEW WEEKS AGO BUT DOESNT HAVE CARD WITH PT- PT WILL BRING DOS TO BE ADDED TO EPIC    • Diabetes mellitus (HCC)     CHECSK SUGAR ONCE PER WEEK    • History of kidney stones     X3 - HAD SRUGERY    • Hoarseness     D/T THROAT NODULE REMOVED    • Hypertension    • Infection     UNSURE WHAT ACCTUAL INFECTION WAS IN KNEE    • Low back pain      PSH:    Past Surgical History:   Procedure Laterality Date   • ANKLE SURGERY Left 2013    Left ankle fusion, Dr. Etienne Rubio- 3 SCREWS IN PLACE   • HERNIA REPAIR      UMBILICAL HERNIA REPAIR- MESH IN PLACE PT THINKS    • KIDNEY STONE SURGERY      X3   • KNEE SURGERY      CLEANED OUT DUE TO INFECTION - RIGHT    • OTHER SURGICAL HISTORY      THROAT NODULE REMOVED    • REPLACEMENT TOTAL KNEE Right 2015    Dr. Isiah Rubio, 2x        Social History:   Tobacco:   Social History     Tobacco Use   Smoking Status Never Smoker   Smokeless Tobacco Never Used      Alcohol:     Social History     Substance and Sexual Activity   Alcohol Use Never       Vitals:           Pulse 65   Temp 98.1 °F (36.7 °C) (Temporal)   Wt 115 kg (253 lb)   SpO2 98%   BMI 37.36 kg/m²     Physical Exam:  General Appearance:    Alert, cooperative, no distress, appears stated age   Head:    Normocephalic, without obvious abnormality, atraumatic   Lungs:     Clear to auscultation bilaterally, respirations unlabored    Heart:   Regular rate and rhythm, S1 and S2 normal, no murmur, rub    or gallop    Abdomen:    Soft, non-tender, +bowel sounds   Breast Exam:    deferred   Genitalia:    deferred   Extremities:   Extremities normal, atraumatic, no cyanosis or edema   Skin:   Skin color, texture, turgor normal, no rashes or lesions   Neurologic:   Grossly intact   Results Review  LABS:  Lab Results   Component Value Date    WBC 6.83 10/11/2021    HGB 14.6 10/11/2021    HCT 41.1 10/11/2021    MCV 92.8 10/11/2021     10/11/2021    NEUTROABS 4.62 11/11/2014     GLUCOSE 158 (H) 10/11/2021    BUN 19 10/11/2021    CREATININE 0.94 10/11/2021    EGFRIFNONA 80 10/11/2021     10/11/2021    K 4.0 10/11/2021     10/11/2021    CO2 24.0 10/11/2021    CALCIUM 9.2 10/11/2021    ALBUMIN 4.2 11/11/2014    AST 33 11/11/2014    ALT 33 11/11/2014    BILITOT 0.5 11/11/2014       RADIOLOGY:  No radiology results for the last 3 days     I reviewed the patient's new clinical results.    Cancer Staging (if applicable)  Cancer Patient: __ yes _X_no __unknown; If yes, clinical stage T:__ N:__M:__, stage group or __N/A    Impression: Spondylolisthesis L4-L5 with neurogenic claudication    Plan: Lumbar laminectomy, bilateral posterior lumbar interbody fusion L4-L5      Donna Murguia, RHETT   10/13/21   10:30 AM EDT

## 2021-10-13 NOTE — ANESTHESIA PREPROCEDURE EVALUATION
Anesthesia Evaluation                  Airway   Mallampati: I  TM distance: >3 FB  Neck ROM: full  No difficulty expected  Dental      Pulmonary    Cardiovascular     ECG reviewed    (+) hypertension,       Neuro/Psych  (+) numbness,     GI/Hepatic/Renal/Endo    (+) obesity,   diabetes mellitus, thyroid problem hypothyroidism    Musculoskeletal     Abdominal    Substance History      OB/GYN          Other   arthritis,                      Anesthesia Plan    ASA 3     general     intravenous induction     Anesthetic plan, all risks, benefits, and alternatives have been provided, discussed and informed consent has been obtained with: patient.    Plan discussed with CRNA.

## 2021-10-13 NOTE — ANESTHESIA POSTPROCEDURE EVALUATION
Patient: Tay Fonseac    Procedure Summary     Date: 10/13/21 Room / Location:  [facility]  [facility]  [facility]    Anesthesia Start: 1129 Anesthesia Stop: 1405    Procedure: LUMBAR LAMINECTOMY POSTERIOR LUMBAR INTERBODY FUSION L4-5 (Bilateral Spine Lumbar) Diagnosis:       Sciatica associated with disorder of lumbar spine      (Sciatica associated with disorder of lumbar spine [M53.86])    Surgeons: Jacek Smith MD Provider: Mauricio Flores MD    Anesthesia Type: general ASA Status: 3          Anesthesia Type: general    Vitals  Vitals Value Taken Time   /94 10/13/21 1402   Temp 97.9 °F (36.6 °C) 10/13/21 1402   Pulse 61 10/13/21 1404   Resp 16 10/13/21 1402   SpO2 97 % 10/13/21 1404   Vitals shown include unvalidated device data.        Post Anesthesia Care and Evaluation    Patient location during evaluation: PACU  Patient participation: complete - patient participated  Level of consciousness: awake and responsive to verbal stimuli  Pain score: 1  Pain management: satisfactory to patient  Airway patency: patent  Anesthetic complications: No anesthetic complications  PONV Status: none  Cardiovascular status: hemodynamically stable and acceptable  Respiratory status: nonlabored ventilation, acceptable, nasal cannula and spontaneous ventilation  Hydration status: acceptable

## 2021-10-13 NOTE — ANESTHESIA PROCEDURE NOTES
Airway  Urgency: elective    Date/Time: 10/13/2021 11:34 AM  Airway not difficult    General Information and Staff    Patient location during procedure: OR  Anesthesiologist: Mauricio Flores MD  CRNA: Haroldo Simpson CRNA    Indications and Patient Condition  Indications for airway management: airway protection    Preoxygenated: yes  MILS not maintained throughout  Mask difficulty assessment: 1 - vent by mask    Final Airway Details  Final airway type: endotracheal airway      Successful airway: ETT  Cuffed: yes   Successful intubation technique: direct laryngoscopy  Endotracheal tube insertion site: oral  Blade: Phuong  Blade size: 3  ETT size (mm): 7.5  Cormack-Lehane Classification: grade I - full view of glottis  Placement verified by: chest auscultation and capnometry   Measured from: lips  ETT/EBT  to lips (cm): 20  Number of attempts at approach: 1  Assessment: lips, teeth, and gum same as pre-op and atraumatic intubation    Additional Comments  Negative epigastric sounds, Breath sound equal bilaterally with symmetric chest rise and fall

## 2021-10-13 NOTE — OP NOTE
Operation note Lumbar Fusion       Preoperative diagnosis left-sided large L4 5 lateral recess was stenosis and grade 1 spondylolisthesis    Postoperative diagnosis same    Procedures performed   1.  L4 partial laminectomy bilateral lateral decompressions  2.  Bilateral transforaminal decompression L4 5  3.  Transforaminal lumbar interbody decompression and fusion with placement of  2 [employer] expandable 12  mm expandable cage packed with autograft from the laminectomized bone  4.  Stealth neuro navigation and O arm assisted placement of L4-L5 pedicle screws 5.5 and 6.5 mm respectively   5.  Autograft harvesting through the same incision    Surgeon: Jacek Smith MD     Assistants: Josiah Yun my physician's assistant was present and personally scrubbed the entirety of the procedure, they assisted suctioning, retraction, approach, and closure of the case    Anesthesia: Normal endotracheal anesthesia    ASA Class: 2  Blood loss 125 cc    Severe lateral recess compression    Complications none        Procedure in detail after formal written consent was obtained the patient was taken to the operating room endotracheally intubated given preoperative antimicrobial prophylaxis they were prepped and draped in the usual sterile manner all bony prominences and genitalia were padded to prevent neurologic injury.    At this point in time the patient was given local anesthesia at the operative level fluoroscopic guidance confirmed the correct level and a small midline incision was made paraspinal musculature was dissected off the L4 lamina which allowed access to the L4 pedicle as well as L5 pedicles bilateral facetectomies were performed.      This allowed access to the transforaminal decompression area of the bilateral disc this was coagulated the disc space was entered into and multiple curettes were used to fashion and prepped the endplate for cage placement.  Bone was harvested using a Luken trap    Placement at  this point time of the Medtronic 11-13 bilateral expandable cages  Were performed packed with autograft bone remainder of the autograft was impacted into the cage    Adequate decompression of the spinal nerve roots by using a ball probe was used to pass and all the respective foramina at the 4 and 5 levels to demonstrate that they were clear.  Fluoroscopic imaging confirmed adequate cage placement.  At this point time on the spinous process the navigation array was affixed 3-D rotational spent with the O arm was performed and subsequent placement using high-speed bur allowed placement of the L4 and L5 pedicle screws and a medial lateral trajectory through the decorticated pars.  5 .5 mm taps were used followed by instrumentation of the screws.  5.5, 6.5 mm screws were placed at the above levels.  At this point time the posterior rods were placed and torqued her appropriate tightness after compressing the ipsilateral side of the cage    The areas were copiously irrigated, meticulous hemostasis was maintained and a small flat MANDA drain was placed and tunneled through the skin skin was then closed in layers.    Fluoroscopic imaging again confirmed the correct level and appropriate instrumentation placement.    Findings of the surgery were discussed with the family

## 2021-10-13 NOTE — PLAN OF CARE
"Goal Outcome Evaluation:  Plan of Care Reviewed With: patient, spouse        Progress: no change  Outcome Summary: Pt arrived on unit from PACU at 1620. BP elevated with most recent BP being 172/101. Orders in place to notify provider of BP >190/110. Pt denies pain, numbness, or tingling. Chief complaint this shift has been nausea and vomiting. BRENDA Medina notified and an order was received for PRN Zofran. Pt becomes tachycardic (120's) with episodes of nausea/vomiting. Pt weaned to RA. Pt refused to ambulated stating that he \"feels sluggish\". Charge nurse notified. 40 mL of sanguineous drainage out of MANDA drain since arrival on the unit. Coverderm dressing is dry and intact with a small amount of sanguineous drainage present. Pt voids spontaneously.  "

## 2021-10-14 VITALS
WEIGHT: 250 LBS | OXYGEN SATURATION: 93 % | TEMPERATURE: 98 F | BODY MASS INDEX: 37.03 KG/M2 | HEART RATE: 66 BPM | DIASTOLIC BLOOD PRESSURE: 83 MMHG | HEIGHT: 69 IN | RESPIRATION RATE: 17 BRPM | SYSTOLIC BLOOD PRESSURE: 142 MMHG

## 2021-10-14 LAB
GLUCOSE BLDC GLUCOMTR-MCNC: 166 MG/DL (ref 70–130)
GLUCOSE BLDC GLUCOMTR-MCNC: 183 MG/DL (ref 70–130)
GLUCOSE BLDC GLUCOMTR-MCNC: 262 MG/DL (ref 70–130)

## 2021-10-14 PROCEDURE — 25010000002 CEFAZOLIN PER 500 MG: Performed by: NEUROLOGICAL SURGERY

## 2021-10-14 PROCEDURE — 97116 GAIT TRAINING THERAPY: CPT

## 2021-10-14 PROCEDURE — A9270 NON-COVERED ITEM OR SERVICE: HCPCS | Performed by: NEUROLOGICAL SURGERY

## 2021-10-14 PROCEDURE — 82962 GLUCOSE BLOOD TEST: CPT

## 2021-10-14 PROCEDURE — 63710000001 GLIPIZIDE 5 MG TABLET: Performed by: NEUROLOGICAL SURGERY

## 2021-10-14 PROCEDURE — 99024 POSTOP FOLLOW-UP VISIT: CPT | Performed by: PHYSICIAN ASSISTANT

## 2021-10-14 PROCEDURE — 63710000001 LEVOTHYROXINE 100 MCG TABLET: Performed by: NEUROLOGICAL SURGERY

## 2021-10-14 PROCEDURE — 63710000001 TEMAZEPAM 15 MG CAPSULE: Performed by: NEUROLOGICAL SURGERY

## 2021-10-14 PROCEDURE — 63710000001 LISINOPRIL 5 MG TABLET: Performed by: NEUROLOGICAL SURGERY

## 2021-10-14 PROCEDURE — 63710000001 PANTOPRAZOLE 40 MG TABLET DELAYED-RELEASE: Performed by: NEUROLOGICAL SURGERY

## 2021-10-14 PROCEDURE — 97162 PT EVAL MOD COMPLEX 30 MIN: CPT

## 2021-10-14 PROCEDURE — 63710000001 ACETAMINOPHEN 325 MG TABLET: Performed by: NEUROLOGICAL SURGERY

## 2021-10-14 PROCEDURE — 63710000001 OXYCODONE 10 MG TABLET EXTENDED-RELEASE 12 HOUR: Performed by: NEUROLOGICAL SURGERY

## 2021-10-14 RX ADMIN — ACETAMINOPHEN 650 MG: 325 TABLET, FILM COATED ORAL at 00:59

## 2021-10-14 RX ADMIN — LISINOPRIL 5 MG: 5 TABLET ORAL at 08:25

## 2021-10-14 RX ADMIN — LEVOTHYROXINE SODIUM 100 MCG: 100 TABLET ORAL at 08:25

## 2021-10-14 RX ADMIN — ACETAMINOPHEN 650 MG: 325 TABLET, FILM COATED ORAL at 05:59

## 2021-10-14 RX ADMIN — GLIPIZIDE 5 MG: 5 TABLET ORAL at 05:59

## 2021-10-14 RX ADMIN — TEMAZEPAM 15 MG: 15 CAPSULE ORAL at 01:07

## 2021-10-14 RX ADMIN — CEFAZOLIN 3 G: 10 INJECTION, POWDER, FOR SOLUTION INTRAVENOUS at 01:00

## 2021-10-14 RX ADMIN — OXYCODONE HYDROCHLORIDE 10 MG: 10 TABLET, FILM COATED, EXTENDED RELEASE ORAL at 08:25

## 2021-10-14 RX ADMIN — PANTOPRAZOLE SODIUM 40 MG: 40 TABLET, DELAYED RELEASE ORAL at 05:59

## 2021-10-14 NOTE — THERAPY EVALUATION
Patient Name: Tay Fonseca  : 1955    MRN: [id number]                              Today's Date: 10/14/2021       Admit Date: 10/13/2021    Visit Dx:     ICD-10-CM ICD-9-CM   1. Spondylolisthesis at L4-L5 level  M43.16 756.12   2. Sciatica associated with disorder of lumbar spine  M53.86 724.3     Patient Active Problem List   Diagnosis   • Spondylolisthesis at L4-L5 level   • Sciatica associated with disorder of lumbar spine     Past Medical History:   Diagnosis Date   • Arthritis    • COVID-19 vaccine series completed     HAD BOTH SHOTS AND HAD BOOSTER FEW WEEKS AGO BUT DOESNT HAVE CARD WITH PT- PT WILL BRING DOS TO BE ADDED TO EPIC    • Diabetes mellitus (HCC)     CHECSK SUGAR ONCE PER WEEK    • History of kidney stones     X3 - HAD SRUGERY    • Hoarseness     D/T THROAT NODULE REMOVED    • Hypertension    • Infection     UNSURE WHAT ACCTUAL INFECTION WAS IN KNEE    • Low back pain      Past Surgical History:   Procedure Laterality Date   • ANKLE SURGERY Left     Left ankle fusion, Dr. Etienne Rubio- 3 SCREWS IN PLACE   • HERNIA REPAIR      UMBILICAL HERNIA REPAIR- MESH IN PLACE PT THINKS    • KIDNEY STONE SURGERY      X3   • KNEE SURGERY      CLEANED OUT DUE TO INFECTION - RIGHT    • LUMBAR DISCECTOMY FUSION INSTRUMENTATION Bilateral 10/13/2021    Procedure: LUMBAR LAMINECTOMY POSTERIOR LUMBAR INTERBODY FUSION L4-5;  Surgeon: Jacek Smith MD;  Location: [facility];  Service: Neurosurgery;  Laterality: Bilateral;   • OTHER SURGICAL HISTORY      THROAT NODULE REMOVED    • REPLACEMENT TOTAL KNEE Right     Dr. Isiah Rubio, 2x       General Information     Row Name 10/14/21 0934          Physical Therapy Time and Intention    Document Type evaluation (P)   -TA     Mode of Treatment physical therapy (P)   -TA     Row Name 10/14/21 0934          General Information    Patient Profile Reviewed yes (P)   -TA     Prior Level of Function independent:; all household mobility;  community mobility; gait; transfer; bed mobility; driving (P)   -TA     Existing Precautions/Restrictions hip; other (see comments) (P)   spinal, L4 lami, decomp., fusion L4/5, monitor BP  -TA     Barriers to Rehab medically complex (P)   -TA     Row Name 10/14/21 0934          Living Environment    Lives With spouse (P)   -TA     Row Name 10/14/21 0934          Home Main Entrance    Number of Stairs, Main Entrance one (P)   in garage  -TA     Stair Railings, Main Entrance none (P)   -TA     Row Name 10/14/21 0934          Stairs Within Home, Primary    Stairs, Within Home, Primary 5 (P)   -TA     Stair Railings, Within Home, Primary none (P)   -TA     Row Name 10/14/21 0934          Cognition    Orientation Status (Cognition) oriented x 3 (P)   -TA     Row Name 10/14/21 0934          Safety Issues, Functional Mobility    Safety Issues Affecting Function (Mobility) safety precaution awareness; insight into deficits/self-awareness; safety precautions follow-through/compliance (P)   -TA     Impairments Affecting Function (Mobility) balance; pain; strength (P)   -TA           User Key  (r) = Recorded By, (t) = Taken By, (c) = Cosigned By    Initials Name Provider Type    TA Pratima Hill, PT Student PT Student               Mobility     Row Name 10/14/21 0937          Bed Mobility    Bed Mobility supine-sit (P)   -TA     Supine-Sit [place] (Bed Mobility) contact guard (P)   -TA     Assistive Device (Bed Mobility) bed rails; head of bed elevated (P)   -TA     Comment (Bed Mobility) VC for log roll and sequencing (P)   -TA     Row Name 10/14/21 0937          Transfers    Comment (Transfers) VC for HP, safety, and sequencing (P)   -TA     Row Name 10/14/21 0937          Sit-Stand Transfer    Sit-Stand [place] (Transfers) contact guard; 1 person assist (P)   -TA     Assistive Device (Sit-Stand Transfers) walker, front-wheeled (P)   -TA     Row Name 10/14/21 0937          Gait/Stairs (Locomotion)     [place] Level (Gait) contact guard; 1 person assist (P)   -TA     Assistive Device (Gait) cane, straight; walker, front-wheeled (P)   ambulated half with RW, switched to SPC on L.  -TA     Distance in Feet (Gait) 380 (P)   -TA     Deviations/Abnormal Patterns (Gait) bilateral deviations; alana decreased; base of support, wide; gait speed decreased (P)   -TA     Bilateral Gait Deviations forward flexed posture; heel strike decreased (P)   -TA     [place] Level (Stairs) contact guard (P)   -TA     Assistive Device (Stairs) cane, straight (P)   -TA     Handrail Location (Stairs) right side (ascending); right side (descending) (P)   -TA     Number of Steps (Stairs) 3/2 (P)   Ascended 3, descended 2  -TA     Ascending Technique (Stairs) step-to-step (P)   -TA     Descending Technique (Stairs) step-to-step (P)   -TA     Comment (Gait/Stairs) Pt ambulated 380' with CGA and RW/SPC on L. Assisted pt to bathroom, upon exiting pt felt sick at stomach. Nsg checked blood glucose. Pt denied dizziness and stated he wanted to continue with ambulation. Pt ambulated half with RW and other half with SPC on L. Pt ascended/descended stairs with CGA and SPC on L. VC for sequencing and cane placement. (P)   -TA           User Key  (r) = Recorded By, (t) = Taken By, (c) = Cosigned By    Initials Name Provider Type    TA Pratima Hill, PT Student PT Student               Obj/Interventions     Row Name 10/14/21 0948          Range of Motion Comprehensive    General Range of Motion bilateral lower extremity ROM WFL (P)   -TA     Comment, General Range of Motion BLE WFL (P)   -TA     Row Name 10/14/21 0992          Strength Comprehensive (MMT)    General Manual Muscle Testing (MMT) Assessment lower extremity strength deficits identified (P)   -TA     Comment, General Manual Muscle Testing (MMT) Assessment BLE MMT Grossly 4 to 4+/5 (P)   -TA     Row Name 10/14/21 0992          Motor Skills    Therapeutic Exercise -- (P)    Issued HEP handout  -TA     Row Name 10/14/21 0922          Balance    Balance Assessment sitting static balance; sitting dynamic balance; standing static balance; standing dynamic balance (P)   -TA     Static Sitting Balance sitting, edge of bed; sitting in chair; WNL (P)   -TA     Dynamic Sitting Balance sitting in chair; sitting, edge of bed; WNL (P)   -TA     Static Standing Balance WNL; supported; standing (P)   -TA     Dynamic Standing Balance WFL; supported; standing (P)   -TA     Balance Interventions sitting; standing; sit to stand; static; dynamic (P)   -TA     Comment, Balance Pt ambulated with RW/SPC on L. Ascended/descended stairs with SPC on L. No instances of LOB noted (P)   -TA     Row Name 10/14/21 0924          Sensory Assessment (Somatosensory)    Sensory Assessment (Somatosensory) -- (P)   Pt denies N&T. Stated resolved post sx  -TA           User Key  (r) = Recorded By, (t) = Taken By, (c) = Cosigned By    Initials Name Provider Type    TA Pratima Hill, PT Student PT Student               Goals/Plan     Row Name 10/14/21 0921          Bed Mobility Goal 1 (PT)    Activity/Assistive Device (Bed Mobility Goal 1, PT) sit to supine; supine to sit (P)   -TA     [place] Level/Cues Needed (Bed Mobility Goal 1, PT) modified independence (P)   -TA     Time Frame (Bed Mobility Goal 1, PT) long term goal (LTG); 10 days (P)   -TA     Row Name 10/14/21 0974          Transfer Goal 1 (PT)    Activity/Assistive Device (Transfer Goal 1, PT) sit-to-stand/stand-to-sit; bed-to-chair/chair-to-bed (P)   -TA     [place] Level/Cues Needed (Transfer Goal 1, PT) modified independence (P)   -TA     Time Frame (Transfer Goal 1, PT) long term goal (LTG); 10 days (P)   -TA     Row Name 10/14/21 0984          Gait Training Goal 1 (PT)    Activity/Assistive Device (Gait Training Goal 1, PT) gait (walking locomotion); cane, straight (P)   -TA     [place] Level (Gait Training Goal 1, PT) modified  independence (P)   -TA     Distance (Gait Training Goal 1, PT) 500 (P)   -TA     Time Frame (Gait Training Goal 1, PT) long term goal (LTG); 10 days (P)   -TA     Row Name 10/14/21 0964          Stairs Goal 1 (PT)    Activity/Assistive Device (Stairs Goal 1, PT) stairs, all skills (P)   -TA     [place] Level/Cues Needed (Stairs Goal 1, PT) standby assist (P)   -TA     Number of Stairs (Stairs Goal 1, PT) 5 (P)   -TA     Time Frame (Stairs Goal 1, PT) long term goal (LTG); 10 days (P)   -TA     Row Name 10/14/21 0996          Patient Education Goal (PT)    Activity (Patient Education Goal, PT) HEP and precautions (P)   -TA     [place]/Cues/Accuracy (Memory Goal 2, PT) demonstrates adequately; independent; verbalizes understanding (P)   -TA     Time Frame (Patient Education Goal, PT) long term goal (LTG); 10 days (P)   -TA           User Key  (r) = Recorded By, (t) = Taken By, (c) = Cosigned By    Initials Name Provider Type    TA Pratima Hill, PT Student PT Student               Clinical Impression     Row Name 10/14/21 0306          Pain    Additional Documentation Pain Scale: Numbers Pre/Post-Treatment (Group) (P)   -TA     Row Name 10/14/21 7885          Pain Scale: Numbers Pre/Post-Treatment    Pretreatment Pain Rating 3/10 (P)   -TA     Posttreatment Pain Rating 3/10 (P)   -TA     Pain Location - Orientation incisional (P)   -TA     Pain Location back (P)   -TA     Pre/Posttreatment Pain Comment Tolerated session. (P)   -TA     Pain Intervention(s) Repositioned; Ambulation/increased activity (P)   -TA     Row Name 10/14/21 9123          Plan of Care Review    Plan of Care Reviewed With patient; spouse (P)   -TA     Outcome Summary PT evaluation completed. Pt demonstrates BLE ROM WFL and MMT grossly 4 to 4+/5. Pt denies N&T at this time. Pt ambulated 380' with CGA and RW/SPC on L. Pt performed bed mobility with CGA and STS with CGA and RW. Pt ascended 3 stairs and descended 2 stairs with CGA and  SPC on L. VC for sequencing and AD placement. Pt educated on log roll technique, safety precautions and issued HEP handout. Pt demonstrated a drop in BP post ambulation. Nsg notified. Pt denies symptoms at this time. Skilled IPPT is warranted to address functional mobility deficits. Recommend home with assist at d/c. (P)   -TA     Row Name 10/14/21 0945          Therapy Assessment/Plan (PT)    Rehab Potential (PT) good, to achieve stated therapy goals (P)   -TA     Criteria for Skilled Interventions Met (PT) yes; meets criteria; skilled treatment is necessary (P)   -TA     Row Name 10/14/21 0945          Vital Signs    Pre Systolic BP Rehab 193 (P)   -TA     Pre Treatment Diastolic  (P)   -TA     Post Systolic BP Rehab 138 (P)   -TA     Post Treatment Diastolic BP 94 (P)   -TA     Pretreatment Heart Rate (beats/min) 74 (P)   -TA     Posttreatment Heart Rate (beats/min) 59 (P)   -TA     Pre SpO2 (%) 96 (P)   -TA     O2 Delivery Pre Treatment room air (P)   -TA     O2 Delivery Intra Treatment room air (P)   -TA     Post SpO2 (%) 90 (P)   -TA     O2 Delivery Post Treatment room air (P)   -TA     Pre Patient Position Supine (P)   -TA     Intra Patient Position Standing (P)   -TA     Post Patient Position Sitting (P)   -TA     Rest Breaks  1 (P)   -TA     Row Name 10/14/21 0945          Positioning and Restraints    Pre-Treatment Position in bed (P)   -TA     Post Treatment Position chair (P)   -TA     In Chair notified nsg; reclined; call light within reach; encouraged to call for assist; exit alarm on; with family/caregiver (P)   -TA           User Key  (r) = Recorded By, (t) = Taken By, (c) = Cosigned By    Initials Name Provider Type    Pratima Whyte, PT Student PT Student               Outcome Measures     Row Name 10/14/21 0951 10/14/21 0800       How much help from another person do you currently need...    Turning from your back to your side while in flat bed without using bedrails? 3 (P)   -TA 3   -LF    Moving from lying on back to sitting on the side of a flat bed without bedrails? 3 (P)   -TA 3  -LF    Moving to and from a bed to a chair (including a wheelchair)? 3 (P)   -TA 3  -LF    Standing up from a chair using your arms (e.g., wheelchair, bedside chair)? 3 (P)   -TA 3  -LF    Climbing 3-5 steps with a railing? 3 (P)   -TA 2  -LF    To walk in hospital room? 3 (P)   -TA 3  -LF    AM-PAC 6 Clicks Score (PT) 18 (P)   -TA 17  -LF    Row Name 10/14/21 0951          Functional Assessment    Outcome Measure Options AM-PAC 6 Clicks Basic Mobility (PT) (P)   -TA           User Key  (r) = Recorded By, (t) = Taken By, (c) = Cosigned By    Initials Name Provider Type    LF Sheryl Morris, RN Registered Nurse    Pratima Whyte, PT Student PT Student                             Physical Therapy Education                 Title: PT OT SLP Therapies (Done)     Topic: Physical Therapy (Done)     Point: Mobility training (Done)     Learning Progress Summary           Patient Acceptance, E, DU,VU by TA at 10/14/2021 0951   Significant Other Acceptance, E, DU,VU by TA at 10/14/2021 0951                   Point: Home exercise program (Done)     Learning Progress Summary           Patient Acceptance, E, DU,VU by TA at 10/14/2021 0951   Significant Other Acceptance, E, DU,VU by TA at 10/14/2021 0951                   Point: Body mechanics (Done)     Learning Progress Summary           Patient Acceptance, E, DU,VU by TA at 10/14/2021 0951   Significant Other Acceptance, E, DU,VU by TA at 10/14/2021 0951                   Point: Precautions (Done)     Learning Progress Summary           Patient Acceptance, E, DU,VU by TA at 10/14/2021 0951   Significant Other Acceptance, E, DU,VU by TA at 10/14/2021 0951                               User Key     Initials Effective Dates Name Provider Type Discipline    TA 07/12/21 -  Pratima Hill, PT Student PT Student PT              PT Recommendation and Plan  Planned Therapy  Interventions (PT): (P) balance training, bed mobility training, gait training, patient/family education, home exercise program, transfer training, stair training, strengthening  Plan of Care Reviewed With: (P) patient, spouse  Outcome Summary: (P) PT evaluation completed. Pt demonstrates BLE ROM WFL and MMT grossly 4 to 4+/5. Pt denies N&T at this time. Pt ambulated 380' with CGA and RW/SPC on L. Pt performed bed mobility with CGA and STS with CGA and RW. Pt ascended 3 stairs and descended 2 stairs with CGA and SPC on L. VC for sequencing and AD placement. Pt educated on log roll technique, safety precautions and issued HEP handout. Pt demonstrated a drop in BP post ambulation. Nsg notified. Pt denies symptoms at this time. Skilled IPPT is warranted to address functional mobility deficits. Recommend home with assist at d/c.     Time Calculation:    PT Charges     Row Name 10/14/21 0841             Time Calculation    Start Time 0841 (P)   -TA      PT Received On 10/14/21 (P)   -TA      PT Goal Re-Cert Due Date 10/24/21 (P)   -TA              Time Calculation- PT    Total Timed Code Minutes- PT 13 minute(s) (P)   -TA              Timed Charges    64913 - Gait Training Minutes  13 (P)   -TA              Untimed Charges    PT Eval/Re-eval Minutes 45 (P)   -TA              Total Minutes    Timed Charges Total Minutes 13 (P)   -TA      Untimed Charges Total Minutes 45 (P)   -TA       Total Minutes 58 (P)   -TA            User Key  (r) = Recorded By, (t) = Taken By, (c) = Cosigned By    Initials Name Provider Type    TA Pratima Hill, PT Student PT Student              Therapy Charges for Today     Code Description Service Date Service Provider Modifiers Qty    99635080941 HC GAIT TRAINING EA 15 MIN 10/14/2021 Pratima Hill, PT Student GP 1    64887270368 HC PT EVAL MOD COMPLEXITY 3 10/14/2021 Pratima Hill, PT Student GP 1          PT G-Codes  Outcome Measure Options: (P) AM-PAC 6 Clicks Basic Mobility  (PT)  AM-PAC 6 Clicks Score (PT): (P) 18    Pratima Hill, PT Student  10/14/2021

## 2021-10-14 NOTE — DISCHARGE SUMMARY
[facility]    Date of Admission: 10/13/2021  Date of Discharge:  10/14/2021    Discharge Diagnosis:   Lateral recess stenosis-severe  Spinal stenosis    Procedures Performed  Procedure(s):  LUMBAR LAMINECTOMY POSTERIOR LUMBAR INTERBODY FUSION L4-5       Presenting Problem/History of Present Illness  Sciatica associated with disorder of lumbar spine [M53.86]  Spondylolisthesis at L4-L5 level [M43.16]     Hospital Course  Patient is a 65 y.o. male presented with symptoms of neurogenic claudication. Uncomplicated surgery. He ambulated with no leg pain, he was voiding, taking po this am. Pain was minimal. Drain pulled without problem.     Condition on Discharge:  satisfactory    Discharge Disposition  Home or Self Care    Discharge Medications     Discharge Medications      New Medications      Instructions Start Date   HYDROcodone-acetaminophen 7.5-325 MG per tablet  Commonly known as: NORCO   2 tablets, Oral, Every 4 Hours PRN         Continue These Medications      Instructions Start Date   aspirin 81 MG chewable tablet   81 mg, Oral, Daily, LD 3 DAYS AGO      glipizide 5 MG tablet  Commonly known as: GLUCOTROL   5 mg, Oral, 2 Times Daily Before Meals      lisinopril 2.5 MG tablet  Commonly known as: PRINIVIL,ZESTRIL   No dose, route, or frequency recorded.      metFORMIN  MG 24 hr tablet  Commonly known as: GLUCOPHAGE-XR   500 mg, Oral, 2 times daily      omeprazole 40 MG capsule  Commonly known as: priLOSEC   40 mg, Oral, Daily      rosuvastatin 10 MG tablet  Commonly known as: CRESTOR   10 mg, Oral, Daily      Synthroid 100 MCG tablet  Generic drug: levothyroxine   100 mcg, Oral, Daily             Follow-up Appointments  No future appointments.  Additional Instructions for the Follow-ups that You Need to Schedule     Discharge Follow-up with Specified Provider: coni ball; 3 Weeks   As directed      To: coni ball    Follow Up: 3 Weeks    Follow Up  Details: f/u 2-3 weeks               Referring Provider  MD MARTINEZ Mays Evan Keith, MD Debbie A Croucher, PA-C  10/14/21  10:04 EDT

## 2021-10-14 NOTE — PLAN OF CARE
Goal Outcome Evaluation:  Plan of Care Reviewed With: (P) patient, spouse           Outcome Summary: (P) PT evaluation completed. Pt demonstrates BLE ROM WFL and MMT grossly 4 to 4+/5. Pt denies N&T at this time. Pt ambulated 380' with CGA and RW/SPC on L. Pt performed bed mobility with CGA and STS with CGA and RW. Pt ascended 3 stairs and descended 2 stairs with CGA and SPC on L. VC for sequencing and AD placement. Pt educated on log roll technique, safety precautions and issued HEP handout. Pt demonstrated a drop in BP post ambulation. Nsg notified. Pt denies symptoms at this time. Skilled IPPT is warranted to address functional mobility deficits. Recommend home with assist at d/c.

## 2021-10-14 NOTE — CASE MANAGEMENT/SOCIAL WORK
Continued Stay Note  [facility]     Patient Name: Tay Fonseca  MRN: [id number]  Today's Date: 10/14/2021    Admit Date: 10/13/2021     Discharge Plan     Row Name 10/14/21 1405       Plan    Plan Home    Plan Comments I spoke with patient and his wife in regards to discharge planning. They reside in a split level with 1 step to enter. He uses no DME but has the following: grab bars, elevated commode, shower bench, rolling walker.  He tells me he is being set up with a back brace. He voices no concerns with his Medicare or  coverage. His PCP is Josiah Smith. He has advanced directives. No case management needs identified.     Final Discharge Disposition Code 01 - home or self-care               Discharge Codes    No documentation.               Expected Discharge Date and Time     Expected Discharge Date Expected Discharge Time    Oct 14, 2021             Lily Pereyra RN

## 2021-10-14 NOTE — DISCHARGE INSTR - ACTIVITY
You may remove the bandage to shower. Steri-strips remain in place - they will fall off on their own over time.   No hot tubs, swimming pools or tub baths.  Do not rub or pick at incision site, pat dry after shower.   Place clean bandage on incision - keeping covered for 3 days. If no drainage present you may leave open to air.     If you notice increased redness, swelling, drainage, foul smell, or develop a fever please call the office.

## 2021-10-14 NOTE — PLAN OF CARE
Goal Outcome Evaluation:  Plan of Care Reviewed With: patient        Progress: improving  Outcome Summary: VSS. No complaints of pain tonight even when ambulating in hallway. MANDA-Drain has had 130mL of output this shift. Dressing is clean/dry/intact. Pt had no episodes of nausea on this shift. All questions and concerns answered.

## 2021-10-15 ENCOUNTER — DOCUMENTATION (OUTPATIENT)
Dept: NEUROSURGERY | Facility: CLINIC | Age: 66
End: 2021-10-15

## 2021-10-15 NOTE — PROGRESS NOTES
[facility] neurosurgery    10/13/2021    Tay Lynne,   1955      Patient is a 65-year-old white male who presented with spinal stenosis with symptoms of neurogenic claudication.  The patient underwent a lumbar fusion at L4-5 with pedicle screw fixation on 10/13/2021.    I have ordered a prefab rigid LSO for spine stabilization, comfort and pain control.  The patient is instructed to use his brace during activities at home.    Liudmila Thakur PA-C

## 2021-10-18 ENCOUNTER — TELEPHONE (OUTPATIENT)
Dept: NEUROSURGERY | Facility: CLINIC | Age: 66
End: 2021-10-18

## 2021-10-18 NOTE — TELEPHONE ENCOUNTER
I have called patient back and gave him instructions per Erendira. He is to use Miralax every morning with Senorita twice a day. He can also try a box of whole pitted prunes  With water fixed in the micro wave. If this does not help he is to try Mag. Citrate with Dulcolax supp. Patient understood  What to do and thanked me for calling back.

## 2021-10-18 NOTE — TELEPHONE ENCOUNTER
Provider:  Luis  Caller:   Time of call:  8:50   Phone #:  [phone]  Surgery:  Lumbar laminectomy  Surgery Date:  10-13-21  Last visit:  7-29-21   Next visit:10-29-21     YUKI:         Reason for call: Pt called and said that he had not had a BM since surgery. He also states that he is having trouble urinating. Please Advise. Thank you.   Patient says he is having no weakness or numbness any where.  When did it start:since surgery    Where is it located:no BM    How long has this been going on/is this new or the same as before surgery: 10-13-21 dofferent    Characteristics of symptom/severity: no Bm and hard to urinate    Timing- Is it constant or intermittent: constant    What makes it worse: nothing    What makes it better: if patient passes gas he feels some better.    What therapies/medications have you tried:stool softener and colace

## 2021-10-29 ENCOUNTER — OFFICE VISIT (OUTPATIENT)
Dept: NEUROSURGERY | Facility: CLINIC | Age: 66
End: 2021-10-29

## 2021-10-29 VITALS
SYSTOLIC BLOOD PRESSURE: 140 MMHG | WEIGHT: 246.4 LBS | TEMPERATURE: 96.9 F | DIASTOLIC BLOOD PRESSURE: 88 MMHG | BODY MASS INDEX: 36.5 KG/M2 | HEIGHT: 69 IN

## 2021-10-29 DIAGNOSIS — M48.062 SPINAL STENOSIS, LUMBAR REGION, WITH NEUROGENIC CLAUDICATION: Primary | ICD-10-CM

## 2021-10-29 PROCEDURE — 99024 POSTOP FOLLOW-UP VISIT: CPT | Performed by: PHYSICIAN ASSISTANT

## 2021-10-29 RX ORDER — COVID-19 MOLECULAR TEST ASSAY
KIT MISCELLANEOUS SEE ADMIN INSTRUCTIONS
COMMUNITY
Start: 2021-08-16

## 2021-10-29 RX ORDER — SILDENAFIL CITRATE 20 MG/1
TABLET ORAL
COMMUNITY
Start: 2021-08-26

## 2021-10-29 RX ORDER — TAMSULOSIN HYDROCHLORIDE 0.4 MG/1
CAPSULE ORAL
COMMUNITY
Start: 2021-10-19

## 2021-10-29 NOTE — PROGRESS NOTES
Subjective   Patient ID: Tay Fonseca is a 65 y.o. male is here today for follow-up for wound check.    HPI:      Patient very nice 65-year-old gentleman who is a  in [place] who is known to the neurosurgical practice for undergoing a L4-5 lumbar fusion.  Patient back today for wound check.    Steri-Strips are still in place today but those were removed and revealed a well-healed incision.  Patient is diabetic so I have counseled him on keeping his blood sugars tight.    Patient has been using his back brace and really states that he has had very little pain at all.  Patient a little bit of postoperative urinary retention but this resolved over the last couple of days    The following portions of the patient's history were reviewed and updated as appropriate: allergies, current medications, past family history, past medical history, past social history, past surgical history and problem list.    Review of Systems   Constitutional: Negative for activity change, appetite change, chills, diaphoresis, fatigue, fever and unexpected weight change.   HENT: Negative for congestion, dental problem, drooling, ear discharge, ear pain, facial swelling, hearing loss, mouth sores, nosebleeds, postnasal drip, rhinorrhea, sinus pressure, sinus pain, sneezing, sore throat, tinnitus, trouble swallowing and voice change.    Eyes: Negative for photophobia, pain, discharge, redness, itching and visual disturbance.   Respiratory: Negative for apnea, cough, choking, chest tightness, shortness of breath, wheezing and stridor.    Cardiovascular: Negative for chest pain, palpitations and leg swelling.   Gastrointestinal: Negative for abdominal distention, abdominal pain, anal bleeding, blood in stool, constipation, diarrhea, nausea, rectal pain and vomiting.   Endocrine: Negative for cold intolerance, heat intolerance, polydipsia, polyphagia and polyuria.   Genitourinary: Negative for decreased urine volume,  "difficulty urinating, dysuria, enuresis, flank pain, frequency, genital sores, hematuria and urgency.   Musculoskeletal: Negative for arthralgias, back pain, gait problem, joint swelling, myalgias, neck pain and neck stiffness.   Skin: Negative for color change, pallor, rash and wound.   Allergic/Immunologic: Negative for environmental allergies, food allergies and immunocompromised state.   Neurological: Negative for dizziness, tremors, seizures, syncope, facial asymmetry, speech difficulty, weakness, light-headedness, numbness and headaches.   Hematological: Negative for adenopathy. Does not bruise/bleed easily.   Psychiatric/Behavioral: Negative for agitation, behavioral problems, confusion, decreased concentration, dysphoric mood, hallucinations, self-injury, sleep disturbance and suicidal ideas. The patient is not nervous/anxious and is not hyperactive.          Objective    reports that he has never smoked. He has never used smokeless tobacco. He reports that he does not drink alcohol and does not use drugs.   SMOKING STATUS: Non-smoker    Physical Exam:   Vitals:/88 (BP Location: Right arm, Patient Position: Sitting, Cuff Size: Adult)   Temp 96.9 °F (36.1 °C)   Ht 175.3 cm (69\")   Wt 112 kg (246 lb 6.4 oz)   BMI 36.39 kg/m²    BMI: Body mass index is 36.39 kg/m².       Incision:   The incision is well-healed and well approximated.  No signs of infection, bleeding, or erythema.    Musculoskeletal:                                            Dorsiflexion is 5/5 Bilaterally                    Plantarflexion is 5/5 bilaterally                    Hip Flexion 5/5 bilaterally.                        Neurologic:                                         The patient is alert and oriented by 3.                       Sensation is equal bilaterally with no deficit.                        Reflexes:  2+ throughout       Assessment/Plan   Independent Review of Radiographic Studies:    No new films reviewed at this " visit  Medical Decision Making:    Patient is doing very well at this point.  Patient is pleased postsurgical outcome.  Incision is clean, dry.  No signs of infection, bleeding, or erythema.    The patient will follow-up in 4 weeks with AP and lateral x-ray of the Lumbar Spine, after completing physical therapy. The patient understands instructions and limitations for the best post-operative success.    It has been a pleasure providing neurosurgical care.    Josiah Yun PA-C      Patient's Body mass index is 36.39 kg/m². indicating that he is morbidly obese (BMI > 40 or > 35 with obesity - related health condition). Obesity-related health conditions include the following: none. Obesity is newly identified. BMI is is above average; BMI management plan is completed. We discussed portion control and increasing exercise.    Diagnoses and all orders for this visit:    1. Spinal stenosis, lumbar region, with neurogenic claudication (Primary)  -     Ambulatory Referral to Physical Therapy Evaluate and treat (PLIF), POST OP  -     XR Spine Lumbar AP & Lateral; Future      No follow-ups on file.

## 2021-11-29 ENCOUNTER — OFFICE VISIT (OUTPATIENT)
Dept: NEUROSURGERY | Facility: CLINIC | Age: 66
End: 2021-11-29

## 2021-11-29 VITALS
BODY MASS INDEX: 37.95 KG/M2 | SYSTOLIC BLOOD PRESSURE: 146 MMHG | HEIGHT: 69 IN | DIASTOLIC BLOOD PRESSURE: 88 MMHG | TEMPERATURE: 97.4 F | WEIGHT: 256.2 LBS

## 2021-11-29 DIAGNOSIS — M43.16 SPONDYLOLISTHESIS AT L4-L5 LEVEL: Primary | ICD-10-CM

## 2021-11-29 PROCEDURE — 99024 POSTOP FOLLOW-UP VISIT: CPT | Performed by: PHYSICIAN ASSISTANT

## 2021-11-29 NOTE — PATIENT INSTRUCTIONS
Obesity, Adult  Obesity is the condition of having too much total body fat. Being overweight or obese means that your weight is greater than what is considered healthy for your body size. Obesity is determined by a measurement called BMI. BMI is an estimate of body fat and is calculated from height and weight. For adults, a BMI of 30 or higher is considered obese.  Obesity can lead to other health concerns and major illnesses, including:  · Stroke.  · Coronary artery disease (CAD).  · Type 2 diabetes.  · Some types of cancer, including cancers of the colon, breast, uterus, and gallbladder.  · Osteoarthritis.  · High blood pressure (hypertension).  · High cholesterol.  · Sleep apnea.  · Gallbladder stones.  · Infertility problems.  What are the causes?  Common causes of this condition include:  · Eating daily meals that are high in calories, sugar, and fat.  · Being born with genes that may make you more likely to become obese.  · Having a medical condition that causes obesity, including:  ? Hypothyroidism.  ? Polycystic ovarian syndrome (PCOS).  ? Binge-eating disorder.  ? Cushing syndrome.  · Taking certain medicines, such as steroids, antidepressants, and seizure medicines.  · Not being physically active (sedentary lifestyle).  · Not getting enough sleep.  · Drinking high amounts of sugar-sweetened beverages, such as soft drinks.  What increases the risk?  The following factors may make you more likely to develop this condition:  · Having a family history of obesity.  · Being a woman of  descent.  · Being a man of  descent.  · Living in an area with limited access to:  ? [place], recreation centers, or sidewalks.  ? Healthy food choices, such as grocery stores and farmers' markets.  What are the signs or symptoms?  The main sign of this condition is having too much body fat.  How is this diagnosed?  This condition is diagnosed based on:  · Your BMI. If you are an adult with a BMI of 30 or  higher, you are considered obese.  · Your waist circumference. This measures the distance around your waistline.  · Your skinfold thickness. Your health care provider may gently pinch a fold of your skin and measure it.  You may have other tests to check for underlying conditions.  How is this treated?  Treatment for this condition often includes changing your lifestyle. Treatment may include some or all of the following:  · Dietary changes. This may include developing a healthy meal plan.  · Regular physical activity. This may include activity that causes your heart to beat faster (aerobic exercise) and strength training. Work with your health care provider to design an exercise program that works for you.  · Medicine to help you lose weight if you are unable to lose 1 pound a week after 6 weeks of healthy eating and more physical activity.  · Treating conditions that cause the obesity (underlying conditions).  · Surgery. Surgical options may include gastric banding and gastric bypass. Surgery may be done if:  ? Other treatments have not helped to improve your condition.  ? You have a BMI of 40 or higher.  ? You have life-threatening health problems related to obesity.  Follow these instructions at home:  Eating and drinking    · Follow recommendations from your health care provider about what you eat and drink. Your health care provider may advise you to:  ? Limit fast food, sweets, and processed snack foods.  ? Choose low-fat options, such as low-fat milk instead of whole milk.  ? Eat 5 or more servings of fruits or vegetables every day.  ? Eat at home more often. This gives you more control over what you eat.  ? Choose healthy foods when you eat out.  ? Learn to read food labels. This will help you understand how much food is considered 1 serving.  ? Learn what a healthy serving size is.  ? Keep low-fat snacks available.  ? Limit sugary drinks, such as soda, fruit juice, sweetened iced tea, and flavored  milk.  · Drink enough water to keep your urine pale yellow.  · Do not follow a fad diet. Fad diets can be unhealthy and even dangerous.    Physical activity  · Exercise regularly, as told by your health care provider.  ? Most adults should get up to 150 minutes of moderate-intensity exercise every week.  ? Ask your health care provider what types of exercise are safe for you and how often you should exercise.  · Warm up and stretch before being active.  · Cool down and stretch after being active.  · Rest between periods of activity.  Lifestyle  · Work with your health care provider and a dietitian to set a weight-loss goal that is healthy and reasonable for you.  · Limit your screen time.  · Find ways to reward yourself that do not involve food.  · Do not drink alcohol if:  ? Your health care provider tells you not to drink.  ? You are pregnant, may be pregnant, or are planning to become pregnant.  · If you drink alcohol:  ? Limit how much you use to:  § 0-1 drink a day for women.  § 0-2 drinks a day for men.  ? Be aware of how much alcohol is in your drink. In the [place] one drink equals one 12 oz bottle of beer (355 mL), one 5 oz glass of wine (148 mL), or one 1½ oz glass of hard liquor (44 mL).  General instructions  · Keep a weight-loss journal to keep track of the food you eat and how much exercise you get.  · Take over-the-counter and prescription medicines only as told by your health care provider.  · Take vitamins and supplements only as told by your health care provider.  · Consider joining a support group. Your health care provider may be able to recommend a support group.  · Keep all follow-up visits as told by your health care provider. This is important.  Contact a health care provider if:  · You are unable to meet your weight loss goal after 6 weeks of dietary and lifestyle changes.  Get help right away if you are having:  · Trouble breathing.  · Suicidal thoughts or behaviors.  Summary  · Obesity is  the condition of having too much total body fat.  · Being overweight or obese means that your weight is greater than what is considered healthy for your body size.  · Work with your health care provider and a dietitian to set a weight-loss goal that is healthy and reasonable for you.  · Exercise regularly, as told by your health care provider. Ask your health care provider what types of exercise are safe for you and how often you should exercise.  This information is not intended to replace advice given to you by your health care provider. Make sure you discuss any questions you have with your health care provider.  Document Revised: 08/22/2019 Document Reviewed: 08/22/2019  Elsevier Patient Education © 2021 [employer]

## 2021-11-29 NOTE — PROGRESS NOTES
Patient: Tay Fonseca  : 1955    Primary Care Provider: Josiah Smith MD      Chief Complaint: None/x-ray follow-up    History of Present Illness:       Patient very nice 66-year-old gentleman who is  at [facility].  Patient had a L4-5 lumbar fusion on 10/13/2021.    Patient is back today with postop x-rays.  X-rays look to be within normal limits showing good placement hardware and screws as well as cages.    Patient is having virtually no pain and is starting to wean out of his back brace as directed.    Review of Systems   Constitutional: Negative for activity change, appetite change, chills, diaphoresis, fatigue, fever and unexpected weight change.   HENT: Negative for congestion, dental problem, drooling, ear discharge, ear pain, facial swelling, hearing loss, mouth sores, nosebleeds, postnasal drip, rhinorrhea, sinus pressure, sinus pain, sneezing, sore throat, tinnitus, trouble swallowing and voice change.    Eyes: Negative for photophobia, pain, discharge, redness, itching and visual disturbance.   Respiratory: Negative for apnea, cough, choking, chest tightness, shortness of breath, wheezing and stridor.    Cardiovascular: Negative for chest pain, palpitations and leg swelling.   Gastrointestinal: Negative for abdominal distention, abdominal pain, anal bleeding, blood in stool, constipation, diarrhea, nausea, rectal pain and vomiting.   Endocrine: Negative for cold intolerance, heat intolerance, polydipsia, polyphagia and polyuria.   Genitourinary: Negative for decreased urine volume, difficulty urinating, dysuria, enuresis, flank pain, frequency, genital sores, hematuria and urgency.   Musculoskeletal: Negative for arthralgias, back pain, gait problem, joint swelling, myalgias, neck pain and neck stiffness.   Skin: Negative for color change, pallor, rash and wound.   Allergic/Immunologic: Negative for environmental allergies, food allergies and immunocompromised  state.   Neurological: Negative for dizziness, tremors, seizures, syncope, facial asymmetry, speech difficulty, weakness, light-headedness, numbness and headaches.   Hematological: Negative for adenopathy. Does not bruise/bleed easily.   Psychiatric/Behavioral: Negative for agitation, behavioral problems, confusion, decreased concentration, dysphoric mood, hallucinations, self-injury, sleep disturbance and suicidal ideas. The patient is not nervous/anxious and is not hyperactive.        Past Medical History:     Past Medical History:   Diagnosis Date   • Arthritis    • COVID-19 vaccine series completed     HAD BOTH SHOTS AND HAD BOOSTER FEW WEEKS AGO BUT DOESNT HAVE CARD WITH PT- PT WILL BRING DOS TO BE ADDED TO EPIC    • Diabetes mellitus (HCC)     CHECSK SUGAR ONCE PER WEEK    • History of kidney stones     X3 - HAD SRUGERY    • Hoarseness     D/T THROAT NODULE REMOVED    • Hypertension    • Infection     UNSURE WHAT ACCTUAL INFECTION WAS IN KNEE    • Low back pain        Family History:     Family History   Problem Relation Age of Onset   • Arthritis Father    • Heart disease Father    • Cancer Sister        Social History:    reports that he has never smoked. He has never used smokeless tobacco. He reports that he does not drink alcohol and does not use drugs.   SMOKING STATUS: Non-smoker    Surgical History:     Past Surgical History:   Procedure Laterality Date   • ANKLE SURGERY Left 2013    Left ankle fusion, Dr. Etienne Rubio- 3 SCREWS IN PLACE   • HERNIA REPAIR      UMBILICAL HERNIA REPAIR- MESH IN PLACE PT THINKS    • KIDNEY STONE SURGERY      X3   • KNEE SURGERY      CLEANED OUT DUE TO INFECTION - RIGHT    • LUMBAR DISCECTOMY FUSION INSTRUMENTATION Bilateral 10/13/2021    Procedure: LUMBAR LAMINECTOMY POSTERIOR LUMBAR INTERBODY FUSION L4-5;  Surgeon: Jacek Smith MD;  [facility];  Service: Neurosurgery;  Laterality: Bilateral;   • OTHER SURGICAL HISTORY      THROAT NODULE  "REMOVED    • REPLACEMENT TOTAL KNEE Right 2015    Dr. Isiah Rubio, 2x        Allergies:   Adhesive tape    Physical Exam:    Vital Signs:/88 (BP Location: Right arm, Patient Position: Sitting, Cuff Size: Adult)   Temp 97.4 °F (36.3 °C)   Ht 175.3 cm (69\")   Wt 116 kg (256 lb 3.2 oz)   BMI 37.83 kg/m²    BMI: Body mass index is 37.83 kg/m².       Incision:   The incision is well-healed and well approximated.  No signs of infection, bleeding, or erythema.    Musculoskeletal:                                            Dorsiflexion is 5/5 Bilaterally                    Plantarflexion is 5/5 bilaterally                    Hip Flexion 5/5 bilaterally.                        Neurologic:                                         The patient is alert and oriented by 3.                       Sensation is equal bilaterally with no deficit.                        Reflexes:  2+ throughout       Medical Decision Making    Data Review:   X-rays show good placement hardware and screws at L4-5 along with bilateral cages    Diagnosis:   Status post L4-5 lumbar fusion    Treatment Options:   At this point the patient is doing very well.  The patient is pleased with postsurgical outcome.  With the resolution of pain, I believe that it is adequate to see the patient back on an as-needed basis.  The patient has been educated on reasons that would necessitate a referral back to us in a more urgent manner.  The patient understands of his instructions and limitations for the best post-operative success.    It has been a pleasure providing neurosurgical care.    Josiah Yun PA-C    Patient's Body mass index is 37.83 kg/m². indicating that he is obese (BMI >30). Obesity-related health conditions include the following: none. Obesity is unchanged. BMI is is above average; BMI management plan is completed. We discussed portion control and increasing exercise.    No diagnosis found.  "

## 2022-01-06 ENCOUNTER — DOCUMENTATION (OUTPATIENT)
Dept: NEUROSURGERY | Facility: CLINIC | Age: 67
End: 2022-01-06

## 2022-01-06 NOTE — PROGRESS NOTES
XR Lumbar Imaging from 11/17/2021 loaded into PACS successfully.   Disc placed into outgoing mail on 01/06/2022.

## (undated) DEVICE — SHEET,DRAPE,40X58,STERILE: Brand: MEDLINE

## (undated) DEVICE — TOOL 14BA60 LEGEND 14CM 6MM: Brand: MIDAS REX ™

## (undated) DEVICE — SYR CONTRL LUERLOK 10CC

## (undated) DEVICE — NEEDLE, QUINCKE 22GX3.5": Brand: MEDLINE INDUSTRIES, INC.

## (undated) DEVICE — ELECTRD BLD EDGE/INSUL1P SFTY SLV 2.75IN

## (undated) DEVICE — PK NEURO DISC 10

## (undated) DEVICE — Device

## (undated) DEVICE — PILLW HDRST INTUB GENTLETOUCH 7IN RT

## (undated) DEVICE — JP PERF DRN SIL FLT 10MM FULL: Brand: CARDINAL HEALTH

## (undated) DEVICE — TRAP,MUCUS SPECIMEN,40CC: Brand: MEDLINE

## (undated) DEVICE — SUT ETHLN 3/0 FS1 30IN 669H

## (undated) DEVICE — ANTIBACTERIAL UNDYED BRAIDED (POLYGLACTIN 910), SYNTHETIC ABSORBABLE SUTURE: Brand: COATED VICRYL

## (undated) DEVICE — SPHR MARKR STEALTH STATION

## (undated) DEVICE — NDL HYPO ECLPS SFTY 25G 1 1/2IN

## (undated) DEVICE — SUT VIC 0 UR6 27IN VCP603H

## (undated) DEVICE — SOL ISO/ALC RUB 70PCT 4OZ

## (undated) DEVICE — GLV SURG PREMIERPRO MIC LTX PF SZ8 BRN

## (undated) DEVICE — DISPOSABLE BIPOLAR FORCEPS 7 3/4" (19.7CM) SCOVILLE BAYONET, INSULATED, 1.5MM TIP AND 12 FT. (3.6M) CABLE: Brand: KIRWAN

## (undated) DEVICE — 3M™ MEDIPORE™ H SOFT CLOTH SURGICAL TAPE, 2863, 3 IN X 10 YD, 12/CASE: Brand: 3M™ MEDIPORE™

## (undated) DEVICE — PACK,UNIVERSAL,NO GOWNS: Brand: MEDLINE

## (undated) DEVICE — TOOL 14MH30 LEGEND 14CM 3MM: Brand: MIDAS REX ™

## (undated) DEVICE — C-ARM: Brand: UNBRANDED

## (undated) DEVICE — NEURO SPONGES: Brand: DEROYAL

## (undated) DEVICE — 3M™ STERI-STRIP™ REINFORCED ADHESIVE SKIN CLOSURES, R1547, 1/2 IN X 4 IN (12 MM X 100 MM), 6 STRIPS/ENVELOPE: Brand: 3M™ STERI-STRIP™

## (undated) DEVICE — INTENDED USE FOR SURGICAL MARKING ON INTACT SKIN, ALSO PROVIDES A PERMANENT METHOD OF IDENTIFYING OBJECTS IN THE OPERATING ROOM: Brand: WRITESITE® REGULAR TIP SKIN MARKER

## (undated) DEVICE — 3M™ STERI-DRAPE™ INSTRUMENT POUCH 1018: Brand: STERI-DRAPE™

## (undated) DEVICE — ADHS LIQ MASTISOL 2/3ML

## (undated) DEVICE — DRSNG WND BORDR/ADHS NONADHR/GZ LF 4X4IN STRL

## (undated) DEVICE — PENCL ROCKRSWCH MEGADYNE W/HOLSTR 10FT SS

## (undated) DEVICE — STRAP POSTN KN/BDY FM 5X72IN DISP

## (undated) DEVICE — APPL CHLORAPREP TINTED 26ML TEAL

## (undated) DEVICE — PATIENT RETURN ELECTRODE, SINGLE-USE, CONTACT QUALITY MONITORING, ADULT, WITH 9FT CORD, FOR PATIENTS WEIGING OVER 33LBS. (15KG): Brand: MEGADYNE

## (undated) DEVICE — SCRB SURG BACTOSHIELD CHG 4PCT 4OZ

## (undated) DEVICE — SYR LL 3CC

## (undated) DEVICE — GLV SURG BIOGEL LTX PF 8